# Patient Record
Sex: MALE | Race: WHITE
[De-identification: names, ages, dates, MRNs, and addresses within clinical notes are randomized per-mention and may not be internally consistent; named-entity substitution may affect disease eponyms.]

---

## 2017-06-23 ENCOUNTER — HOSPITAL ENCOUNTER (INPATIENT)
Dept: HOSPITAL 83 - ED | Age: 82
LOS: 1 days | Discharge: HOME | DRG: 637 | End: 2017-06-24
Attending: INTERNAL MEDICINE | Admitting: INTERNAL MEDICINE
Payer: MEDICARE

## 2017-06-23 VITALS — DIASTOLIC BLOOD PRESSURE: 77 MMHG

## 2017-06-23 VITALS — DIASTOLIC BLOOD PRESSURE: 65 MMHG

## 2017-06-23 VITALS — SYSTOLIC BLOOD PRESSURE: 138 MMHG | DIASTOLIC BLOOD PRESSURE: 91 MMHG

## 2017-06-23 VITALS — DIASTOLIC BLOOD PRESSURE: 68 MMHG

## 2017-06-23 VITALS — BODY MASS INDEX: 21.67 KG/M2 | WEIGHT: 160 LBS | HEIGHT: 71.97 IN

## 2017-06-23 VITALS — DIASTOLIC BLOOD PRESSURE: 80 MMHG

## 2017-06-23 VITALS — SYSTOLIC BLOOD PRESSURE: 139 MMHG | DIASTOLIC BLOOD PRESSURE: 77 MMHG

## 2017-06-23 VITALS — DIASTOLIC BLOOD PRESSURE: 69 MMHG | SYSTOLIC BLOOD PRESSURE: 130 MMHG

## 2017-06-23 DIAGNOSIS — E78.5: ICD-10-CM

## 2017-06-23 DIAGNOSIS — Z95.5: ICD-10-CM

## 2017-06-23 DIAGNOSIS — Z91.041: ICD-10-CM

## 2017-06-23 DIAGNOSIS — Z82.49: ICD-10-CM

## 2017-06-23 DIAGNOSIS — Z88.8: ICD-10-CM

## 2017-06-23 DIAGNOSIS — E03.9: ICD-10-CM

## 2017-06-23 DIAGNOSIS — E87.8: ICD-10-CM

## 2017-06-23 DIAGNOSIS — N18.3: ICD-10-CM

## 2017-06-23 DIAGNOSIS — Z79.899: ICD-10-CM

## 2017-06-23 DIAGNOSIS — F33.3: ICD-10-CM

## 2017-06-23 DIAGNOSIS — I50.32: ICD-10-CM

## 2017-06-23 DIAGNOSIS — E87.5: ICD-10-CM

## 2017-06-23 DIAGNOSIS — E13.10: Primary | ICD-10-CM

## 2017-06-23 DIAGNOSIS — Z82.3: ICD-10-CM

## 2017-06-23 DIAGNOSIS — I25.2: ICD-10-CM

## 2017-06-23 DIAGNOSIS — E13.22: ICD-10-CM

## 2017-06-23 DIAGNOSIS — G47.00: ICD-10-CM

## 2017-06-23 DIAGNOSIS — Z83.3: ICD-10-CM

## 2017-06-23 DIAGNOSIS — N17.0: ICD-10-CM

## 2017-06-23 DIAGNOSIS — Z86.73: ICD-10-CM

## 2017-06-23 DIAGNOSIS — I13.0: ICD-10-CM

## 2017-06-23 DIAGNOSIS — E55.9: ICD-10-CM

## 2017-06-23 DIAGNOSIS — D64.9: ICD-10-CM

## 2017-06-23 DIAGNOSIS — Z79.82: ICD-10-CM

## 2017-06-23 DIAGNOSIS — Z79.4: ICD-10-CM

## 2017-06-23 LAB
BASOPHILS # BLD AUTO: 0.1 10*3/UL (ref 0–0.1)
BASOPHILS NFR BLD AUTO: 0.9 % (ref 0–1)
BUN SERPL-MCNC: 21 MG/DL (ref 7–24)
BUN SERPL-MCNC: 22 MG/DL (ref 7–24)
BUN SERPL-MCNC: 26 MG/DL (ref 7–24)
BUN SERPL-MCNC: 27 MG/DL (ref 7–24)
CHLORIDE SERPL-SCNC: 102 MMOL/L (ref 98–107)
CHLORIDE SERPL-SCNC: 105 MMOL/L (ref 98–107)
CHLORIDE SERPL-SCNC: 108 MMOL/L (ref 98–107)
CHLORIDE SERPL-SCNC: 96 MMOL/L (ref 98–107)
CO2 SERPL-SCNC: 19 MMOL/L (ref 21–32)
CO2 SERPL-SCNC: 20 MMOL/L (ref 21–32)
CO2 SERPL-SCNC: 20 MMOL/L (ref 21–32)
CO2 SERPL-SCNC: 21 MMOL/L (ref 21–32)
EOSINOPHIL # BLD AUTO: 0.1 10*3/UL (ref 0–0.4)
EOSINOPHIL # BLD AUTO: 1.4 % (ref 1–4)
ERYTHROCYTE [DISTWIDTH] IN BLOOD BY AUTOMATED COUNT: 12.7 % (ref 0–14.5)
GLUCOSE SERPL-MCNC: 180 MG/DL (ref 65–99)
GLUCOSE SERPL-MCNC: 224 MG/DL (ref 65–99)
GLUCOSE SERPL-MCNC: 424 MG/DL (ref 65–99)
GLUCOSE SERPL-MCNC: 713 MG/DL (ref 65–99)
HCT VFR BLD AUTO: 34.8 % (ref 42–52)
HGB BLD-MCNC: 12.5 G/DL (ref 14–18)
IG #: 0 10*3/UL (ref 0–0.1)
LYMPHOCYTES # BLD AUTO: 1.1 10*3/UL (ref 1.3–4.4)
LYMPHOCYTES NFR BLD AUTO: 16.4 % (ref 27–41)
MCH RBC QN AUTO: 31.2 PG (ref 27–31)
MCHC RBC AUTO-ENTMCNC: 35.9 G/DL (ref 33–37)
MCV RBC AUTO: 86.8 FL (ref 80–94)
MONOCYTES # BLD AUTO: 0.4 10*3/UL (ref 0.1–1)
MONOCYTES NFR BLD MANUAL: 6.5 % (ref 3–9)
NEUT #: 4.9 10*3/UL (ref 2.3–7.9)
NEUT %: 74.5 % (ref 47–73)
NRBC BLD QL AUTO: 0 % (ref 0–0)
PLATELET # BLD AUTO: 183 10*3/UL (ref 130–400)
PMV BLD AUTO: 12.1 FL (ref 9.6–12.3)
POTASSIUM SERPL-SCNC: 4.2 MMOL/L (ref 3.5–5.1)
POTASSIUM SERPL-SCNC: 4.3 MMOL/L (ref 3.5–5.1)
POTASSIUM SERPL-SCNC: 4.4 MMOL/L (ref 3.5–5.1)
POTASSIUM SERPL-SCNC: 5.2 MMOL/L (ref 3.5–5.1)
RBC # BLD AUTO: 4.01 10*6/UL (ref 4.5–5.9)
SODIUM SERPL-SCNC: 130 MMOL/L (ref 136–145)
SODIUM SERPL-SCNC: 137 MMOL/L (ref 136–145)
SODIUM SERPL-SCNC: 138 MMOL/L (ref 136–145)
SODIUM SERPL-SCNC: 139 MMOL/L (ref 136–145)
WBC NRBC COR # BLD AUTO: 6.6 10*3/UL (ref 4.8–10.8)

## 2017-06-24 VITALS — DIASTOLIC BLOOD PRESSURE: 67 MMHG | SYSTOLIC BLOOD PRESSURE: 109 MMHG

## 2017-06-24 VITALS — SYSTOLIC BLOOD PRESSURE: 105 MMHG | DIASTOLIC BLOOD PRESSURE: 75 MMHG

## 2017-06-24 VITALS — SYSTOLIC BLOOD PRESSURE: 103 MMHG | DIASTOLIC BLOOD PRESSURE: 65 MMHG

## 2017-06-24 VITALS — DIASTOLIC BLOOD PRESSURE: 70 MMHG | SYSTOLIC BLOOD PRESSURE: 110 MMHG

## 2017-06-24 LAB
25(OH)D3 SERPL-MCNC: 15 NG/ML (ref 30–100)
BASOPHILS # BLD AUTO: 0.1 10*3/UL (ref 0–0.1)
BASOPHILS NFR BLD AUTO: 0.7 % (ref 0–1)
BUN SERPL-MCNC: 19 MG/DL (ref 7–24)
BUN SERPL-MCNC: 20 MG/DL (ref 7–24)
CHLORIDE SERPL-SCNC: 104 MMOL/L (ref 98–107)
CHLORIDE SERPL-SCNC: 106 MMOL/L (ref 98–107)
CHOLEST SERPL-MCNC: 133 MG/DL (ref ?–200)
CO2 SERPL-SCNC: 22 MMOL/L (ref 21–32)
CO2 SERPL-SCNC: 23 MMOL/L (ref 21–32)
EOSINOPHIL # BLD AUTO: 0.5 10*3/UL (ref 0–0.4)
EOSINOPHIL # BLD AUTO: 6.1 % (ref 1–4)
ERYTHROCYTE [DISTWIDTH] IN BLOOD BY AUTOMATED COUNT: 12.9 % (ref 0–14.5)
EST. AVERAGE GLUCOSE BLD GHB EST-MCNC: 326 MG/DL
FOLATE SERPL-MCNC: 13.96 NG/ML (ref 5.38–?)
GLUCOSE SERPL-MCNC: 127 MG/DL (ref 65–99)
GLUCOSE SERPL-MCNC: 173 MG/DL (ref 65–99)
HCT VFR BLD AUTO: 35.8 % (ref 42–52)
HDLC SERPL-MCNC: 42 MG/DL (ref 40–60)
HGB BLD-MCNC: 12.6 G/DL (ref 14–18)
IG #: 0 10*3/UL (ref 0–0.1)
LDLC SERPL DIRECT ASSAY-MCNC: 63 MG/DL (ref 9–159)
LYMPHOCYTES # BLD AUTO: 1.8 10*3/UL (ref 1.3–4.4)
LYMPHOCYTES NFR BLD AUTO: 23.4 % (ref 27–41)
MAGNESIUM SERPL-MCNC: 1.8 MG/DL (ref 1.5–2.1)
MCH RBC QN AUTO: 31.3 PG (ref 27–31)
MCHC RBC AUTO-ENTMCNC: 35.2 G/DL (ref 33–37)
MCV RBC AUTO: 88.8 FL (ref 80–94)
MONOCYTES # BLD AUTO: 0.4 10*3/UL (ref 0.1–1)
MONOCYTES NFR BLD MANUAL: 5.7 % (ref 3–9)
NEUT #: 4.8 10*3/UL (ref 2.3–7.9)
NEUT %: 63.8 % (ref 47–73)
NRBC BLD QL AUTO: 0 % (ref 0–0)
PHOSPHATE SERPL-MCNC: 2.1 MG/DL (ref 2.5–4.9)
PLATELET # BLD AUTO: 200 10*3/UL (ref 130–400)
PMV BLD AUTO: 12.3 FL (ref 9.6–12.3)
POTASSIUM SERPL-SCNC: 3.7 MMOL/L (ref 3.5–5.1)
POTASSIUM SERPL-SCNC: 3.7 MMOL/L (ref 3.5–5.1)
RBC # BLD AUTO: 4.03 10*6/UL (ref 4.5–5.9)
SODIUM SERPL-SCNC: 137 MMOL/L (ref 136–145)
SODIUM SERPL-SCNC: 141 MMOL/L (ref 136–145)
T4 FREE SERPL-MCNC: 1.06 NG/DL (ref 0.76–1.46)
TRIGL SERPL-MCNC: 140 MG/DL (ref ?–150)
TSH SERPL DL<=0.005 MIU/L-ACNC: 4.77 UIU/ML (ref 0.36–4.75)
VITAMIN B12: 627 PG/ML (ref 247–911)
VLDLC SERPL CALC-MCNC: 28 MG/DL (ref 6–40)
WBC NRBC COR # BLD AUTO: 7.5 10*3/UL (ref 4.8–10.8)

## 2017-07-06 ENCOUNTER — HOSPITAL ENCOUNTER (OUTPATIENT)
Dept: HOSPITAL 83 - LAB | Age: 82
Discharge: HOME | End: 2017-07-06
Attending: INTERNAL MEDICINE
Payer: MEDICARE

## 2017-07-06 DIAGNOSIS — I50.33: Primary | ICD-10-CM

## 2017-07-06 LAB
ABSOLUTE BASO #: 0.1 10*3/UL (ref 0–0.1)
ABSOLUTE EOS #: 0.3 10*3/UL (ref 0–0.4)
ABSOLUTE NEUT #: 4.4 10*3/UL (ref 2.3–7.9)
ALBUMIN SERPL-MCNC: 3.5 GM/DL (ref 3.1–4.5)
ALBUMIN: 3.5 GM/DL (ref 3.1–4.5)
ALP BLD-CCNC: 55 U/L (ref 45–117)
ALP SERPL-CCNC: 55 U/L (ref 45–117)
ALT SERPL W P-5'-P-CCNC: 20 U/L (ref 12–78)
ALT SERPL-CCNC: 20 U/L (ref 12–78)
AST SERPL-CCNC: 17 IU/L (ref 3–35)
AST SERPL-CCNC: 17 IU/L (ref 3–35)
BASOPHILS # BLD AUTO: 0.1 10*3/UL (ref 0–0.1)
BASOPHILS %: 1.1 % (ref 0–1)
BASOPHILS NFR BLD AUTO: 1.1 % (ref 0–1)
BILIRUB SERPL-MCNC: 0.7 MG/DL (ref 0.2–1)
BUN BLDV-MCNC: 22 MG/DL (ref 7–24)
BUN SERPL-MCNC: 22 MG/DL (ref 7–24)
CALCIUM SERPL-MCNC: 8.4 MG/DL (ref 8.5–10.5)
CHLORIDE BLD-SCNC: 105 MMOL/L (ref 98–107)
CHLORIDE SERPL-SCNC: 105 MMOL/L (ref 98–107)
CO2 SERPL-SCNC: 21 MMOL/L (ref 21–32)
CO2: 21 MMOL/L (ref 21–32)
CREAT SERPL-MCNC: 1.55 MG/DL (ref 0.7–1.3)
EOSINOPHIL # BLD AUTO: 0.3 10*3/UL (ref 0–0.4)
EOSINOPHIL # BLD AUTO: 4.8 % (ref 1–4)
EOSINOPHILS %: 4.8 % (ref 1–4)
ERYTHROCYTE [DISTWIDTH] IN BLOOD BY AUTOMATED COUNT: 13.4 % (ref 0–14.5)
GFR AFRICAN AMERICAN: 52 ML/MIN
GFR SERPL CREATININE-BSD FRML MDRD: 43 ML/MIN/
GLUCOSE SERPL-MCNC: 393 MG/DL (ref 65–99)
GLUCOSE: 393 MG/DL (ref 65–99)
HCT VFR BLD AUTO: 36.6 % (ref 42–52)
HCT VFR BLD CALC: 36.6 % (ref 42–52)
HEMOGLOBIN: 12.6 G/DL (ref 14–18)
HGB BLD-MCNC: 12.6 G/DL (ref 14–18)
IG #: 0 10*3/UL (ref 0–0.1)
IMMATURE GRANULOCYTES #: 0 10*3/UL (ref 0–0.1)
IMMATURE GRANULOCYTES: 0.2 % (ref 0–1)
LYMPHOCYTE %: 19.2 % (ref 27–41)
LYMPHOCYTES # BLD AUTO: 1.3 10*3/UL (ref 1.3–4.4)
LYMPHOCYTES # BLD: 1.3 10*3/UL (ref 1.3–4.4)
LYMPHOCYTES NFR BLD AUTO: 19.2 % (ref 27–41)
MCH RBC QN AUTO: 31.8 PG (ref 27–31)
MCH RBC QN AUTO: 31.8 PG (ref 27–31)
MCHC RBC AUTO-ENTMCNC: 34.4 G/DL (ref 33–37)
MCHC RBC AUTO-ENTMCNC: 34.4 G/DL (ref 33–37)
MCV RBC AUTO: 92.4 FL (ref 80–94)
MCV RBC AUTO: 92.4 FL (ref 80–94)
MONOCYTES # BLD AUTO: 0.5 10*3/UL (ref 0.1–1)
MONOCYTES # BLD: 0.5 10*3/UL (ref 0.1–1)
MONOCYTES %: 7.7 % (ref 3–9)
MONOCYTES NFR BLD MANUAL: 7.7 % (ref 3–9)
NEUT #: 4.4 10*3/UL (ref 2.3–7.9)
NEUT %: 67 % (ref 47–73)
NEUTROPHILS %: 67 % (ref 47–73)
NRBC BLD QL AUTO: 0 % (ref 0–0)
NUCLEATED RED BLOOD CELLS: 0 % (ref 0–0)
PDW BLD-RTO: 13.4 % (ref 0–14.5)
PLATELET # BLD AUTO: 199 10*3/UL (ref 130–400)
PLATELET # BLD: 199 10*3/UL (ref 130–400)
PMV BLD AUTO: 12.2 FL (ref 9.6–12.3)
PMV BLD AUTO: 12.2 FL (ref 9.6–12.3)
POTASSIUM SERPL-SCNC: 5.1 MMOL/L (ref 3.5–5.1)
POTASSIUM SERPL-SCNC: 5.1 MMOL/L (ref 3.5–5.1)
PROT SERPL-MCNC: 6.3 GM/DL (ref 6.4–8.2)
RBC # BLD AUTO: 3.96 10*6/UL (ref 4.5–5.9)
RBC # BLD: 3.96 10*6/UL (ref 4.5–5.9)
SODIUM BLD-SCNC: 137 MMOL/L (ref 136–145)
SODIUM SERPL-SCNC: 137 MMOL/L (ref 136–145)
TOTAL PROTEIN: 6.3 GM/DL (ref 6.4–8.2)
WBC # BLD: 6.6 10*3/UL (ref 4.8–10.8)
WBC NRBC COR # BLD AUTO: 6.6 10*3/UL (ref 4.8–10.8)

## 2017-07-14 ENCOUNTER — HOSPITAL ENCOUNTER (OUTPATIENT)
Dept: HOSPITAL 83 - CARD | Age: 82
Discharge: HOME | End: 2017-07-14
Attending: INTERNAL MEDICINE
Payer: MEDICARE

## 2017-07-14 DIAGNOSIS — I50.33: Primary | ICD-10-CM

## 2017-07-19 ENCOUNTER — HOSPITAL ENCOUNTER (INPATIENT)
Dept: HOSPITAL 83 - ED | Age: 82
LOS: 1 days | Discharge: HOME | DRG: 313 | End: 2017-07-20
Attending: INTERNAL MEDICINE | Admitting: INTERNAL MEDICINE
Payer: MEDICARE

## 2017-07-19 VITALS — SYSTOLIC BLOOD PRESSURE: 112 MMHG | DIASTOLIC BLOOD PRESSURE: 72 MMHG

## 2017-07-19 VITALS — WEIGHT: 162.5 LBS | DIASTOLIC BLOOD PRESSURE: 69 MMHG | BODY MASS INDEX: 22.01 KG/M2 | HEIGHT: 72 IN

## 2017-07-19 VITALS — DIASTOLIC BLOOD PRESSURE: 71 MMHG | SYSTOLIC BLOOD PRESSURE: 119 MMHG

## 2017-07-19 VITALS — DIASTOLIC BLOOD PRESSURE: 58 MMHG | SYSTOLIC BLOOD PRESSURE: 98 MMHG

## 2017-07-19 VITALS — DIASTOLIC BLOOD PRESSURE: 66 MMHG

## 2017-07-19 VITALS — DIASTOLIC BLOOD PRESSURE: 81 MMHG

## 2017-07-19 DIAGNOSIS — Z95.5: ICD-10-CM

## 2017-07-19 DIAGNOSIS — D64.9: ICD-10-CM

## 2017-07-19 DIAGNOSIS — I13.0: ICD-10-CM

## 2017-07-19 DIAGNOSIS — Z88.8: ICD-10-CM

## 2017-07-19 DIAGNOSIS — Z95.1: ICD-10-CM

## 2017-07-19 DIAGNOSIS — Z91.041: ICD-10-CM

## 2017-07-19 DIAGNOSIS — I25.10: ICD-10-CM

## 2017-07-19 DIAGNOSIS — N18.3: ICD-10-CM

## 2017-07-19 DIAGNOSIS — R07.89: Primary | ICD-10-CM

## 2017-07-19 DIAGNOSIS — F32.9: ICD-10-CM

## 2017-07-19 DIAGNOSIS — I50.32: ICD-10-CM

## 2017-07-19 DIAGNOSIS — E03.9: ICD-10-CM

## 2017-07-19 DIAGNOSIS — N17.0: ICD-10-CM

## 2017-07-19 DIAGNOSIS — E78.5: ICD-10-CM

## 2017-07-19 DIAGNOSIS — Z88.5: ICD-10-CM

## 2017-07-19 DIAGNOSIS — I25.2: ICD-10-CM

## 2017-07-19 DIAGNOSIS — E55.9: ICD-10-CM

## 2017-07-19 DIAGNOSIS — E11.22: ICD-10-CM

## 2017-07-19 DIAGNOSIS — Z86.73: ICD-10-CM

## 2017-07-19 DIAGNOSIS — G47.00: ICD-10-CM

## 2017-07-19 LAB
ALBUMIN SERPL-MCNC: 3.4 GM/DL (ref 3.1–4.5)
ALP SERPL-CCNC: 63 U/L (ref 45–117)
ALT SERPL W P-5'-P-CCNC: 16 U/L (ref 12–78)
AST SERPL-CCNC: 11 IU/L (ref 3–35)
BASOPHILS # BLD AUTO: 0.1 10*3/UL (ref 0–0.1)
BASOPHILS NFR BLD AUTO: 1 % (ref 0–1)
BUN SERPL-MCNC: 15 MG/DL (ref 7–24)
CHLORIDE SERPL-SCNC: 107 MMOL/L (ref 98–107)
CO2 SERPL-SCNC: 21 MMOL/L (ref 21–32)
EOSINOPHIL # BLD AUTO: 0.4 10*3/UL (ref 0–0.4)
EOSINOPHIL # BLD AUTO: 4.8 % (ref 1–4)
ERYTHROCYTE [DISTWIDTH] IN BLOOD BY AUTOMATED COUNT: 13.2 % (ref 0–14.5)
GLUCOSE SERPL-MCNC: 259 MG/DL (ref 65–99)
HCT VFR BLD AUTO: 36.6 % (ref 42–52)
HGB BLD-MCNC: 12.7 G/DL (ref 14–18)
IG #: 0 10*3/UL (ref 0–0.1)
INR BLD: 1 (ref 2–3.5)
LYMPHOCYTES # BLD AUTO: 1.5 10*3/UL (ref 1.3–4.4)
LYMPHOCYTES NFR BLD AUTO: 18 % (ref 27–41)
MAGNESIUM SERPL-MCNC: 1.9 MG/DL (ref 1.5–2.1)
MCH RBC QN AUTO: 31.4 PG (ref 27–31)
MCHC RBC AUTO-ENTMCNC: 34.7 G/DL (ref 33–37)
MCV RBC AUTO: 90.6 FL (ref 80–94)
MONOCYTES # BLD AUTO: 0.7 10*3/UL (ref 0.1–1)
MONOCYTES NFR BLD MANUAL: 8.1 % (ref 3–9)
NEUT #: 5.5 10*3/UL (ref 2.3–7.9)
NEUT %: 67.7 % (ref 47–73)
NRBC BLD QL AUTO: 0 % (ref 0–0)
PLATELET # BLD AUTO: 211 10*3/UL (ref 130–400)
PMV BLD AUTO: 11.3 FL (ref 9.6–12.3)
POTASSIUM SERPL-SCNC: 4.7 MMOL/L (ref 3.5–5.1)
PROT SERPL-MCNC: 6.5 GM/DL (ref 6.4–8.2)
PROTHROMBIN TIME: 10.3 SECONDS (ref 9–12.4)
RBC # BLD AUTO: 4.04 10*6/UL (ref 4.5–5.9)
SODIUM SERPL-SCNC: 134 MMOL/L (ref 136–145)
TROPONIN I SERPL-MCNC: < 0.015 NG/ML (ref ?–0.04)
WBC NRBC COR # BLD AUTO: 8.1 10*3/UL (ref 4.8–10.8)

## 2017-07-20 VITALS — DIASTOLIC BLOOD PRESSURE: 75 MMHG

## 2017-07-20 VITALS — DIASTOLIC BLOOD PRESSURE: 66 MMHG

## 2017-07-20 LAB
25(OH)D3 SERPL-MCNC: 34.2 NG/ML (ref 30–100)
ALBUMIN SERPL-MCNC: 3.3 GM/DL (ref 3.1–4.5)
ALP SERPL-CCNC: 61 U/L (ref 45–117)
ALT SERPL W P-5'-P-CCNC: 15 U/L (ref 12–78)
AST SERPL-CCNC: 14 IU/L (ref 3–35)
BASOPHILS # BLD AUTO: 0.1 10*3/UL (ref 0–0.1)
BASOPHILS NFR BLD AUTO: 1.2 % (ref 0–1)
BUN SERPL-MCNC: 17 MG/DL (ref 7–24)
CHLORIDE SERPL-SCNC: 109 MMOL/L (ref 98–107)
CHOLEST SERPL-MCNC: 97 MG/DL (ref ?–200)
CO2 SERPL-SCNC: 23 MMOL/L (ref 21–32)
EOSINOPHIL # BLD AUTO: 0.4 10*3/UL (ref 0–0.4)
EOSINOPHIL # BLD AUTO: 5.3 % (ref 1–4)
ERYTHROCYTE [DISTWIDTH] IN BLOOD BY AUTOMATED COUNT: 13.4 % (ref 0–14.5)
EST. AVERAGE GLUCOSE BLD GHB EST-MCNC: 303 MG/DL
FOLATE SERPL-MCNC: 16.54 NG/ML (ref 5.38–?)
GLUCOSE SERPL-MCNC: 98 MG/DL (ref 65–99)
HCT VFR BLD AUTO: 35.9 % (ref 42–52)
HDLC SERPL-MCNC: 38 MG/DL (ref 40–60)
HGB BLD-MCNC: 12.5 G/DL (ref 14–18)
IG #: 0 10*3/UL (ref 0–0.1)
LDLC SERPL DIRECT ASSAY-MCNC: 41 MG/DL (ref 9–159)
LYMPHOCYTES # BLD AUTO: 1.5 10*3/UL (ref 1.3–4.4)
LYMPHOCYTES NFR BLD AUTO: 21.8 % (ref 27–41)
MAGNESIUM SERPL-MCNC: 2 MG/DL (ref 1.5–2.1)
MCH RBC QN AUTO: 31.9 PG (ref 27–31)
MCHC RBC AUTO-ENTMCNC: 34.8 G/DL (ref 33–37)
MCV RBC AUTO: 91.6 FL (ref 80–94)
MONOCYTES # BLD AUTO: 0.6 10*3/UL (ref 0.1–1)
MONOCYTES NFR BLD MANUAL: 8.1 % (ref 3–9)
NEUT #: 4.4 10*3/UL (ref 2.3–7.9)
NEUT %: 63.5 % (ref 47–73)
NRBC BLD QL AUTO: 0 10*3/UL (ref 0–0)
PHOSPHATE SERPL-MCNC: 3 MG/DL (ref 2.5–4.9)
PLATELET # BLD AUTO: 217 10*3/UL (ref 130–400)
PMV BLD AUTO: 11.9 FL (ref 9.6–12.3)
POTASSIUM SERPL-SCNC: 3.9 MMOL/L (ref 3.5–5.1)
PROT SERPL-MCNC: 6.2 GM/DL (ref 6.4–8.2)
RBC # BLD AUTO: 3.92 10*6/UL (ref 4.5–5.9)
SODIUM SERPL-SCNC: 141 MMOL/L (ref 136–145)
TRIGL SERPL-MCNC: 88 MG/DL (ref ?–150)
VITAMIN B12: 472 PG/ML (ref 247–911)
VLDLC SERPL CALC-MCNC: 18 MG/DL (ref 6–40)
WBC NRBC COR # BLD AUTO: 6.9 10*3/UL (ref 4.8–10.8)

## 2017-08-22 ENCOUNTER — HOSPITAL ENCOUNTER (OUTPATIENT)
Dept: HOSPITAL 83 - CT | Age: 82
Discharge: HOME | End: 2017-08-22
Payer: MEDICARE

## 2017-08-22 DIAGNOSIS — I25.10: Primary | ICD-10-CM

## 2017-08-22 DIAGNOSIS — J43.8: ICD-10-CM

## 2017-08-22 DIAGNOSIS — I63.9: ICD-10-CM

## 2017-08-22 DIAGNOSIS — M47.894: ICD-10-CM

## 2017-08-22 DIAGNOSIS — Z95.1: ICD-10-CM

## 2017-08-22 DIAGNOSIS — E03.4: ICD-10-CM

## 2017-08-22 DIAGNOSIS — R09.89: ICD-10-CM

## 2017-08-22 DIAGNOSIS — I10: ICD-10-CM

## 2017-08-22 DIAGNOSIS — E78.5: ICD-10-CM

## 2017-08-22 DIAGNOSIS — I71.2: ICD-10-CM

## 2017-10-25 ENCOUNTER — HOSPITAL ENCOUNTER (OUTPATIENT)
Dept: HOSPITAL 83 - US | Age: 82
Discharge: HOME | End: 2017-10-25
Payer: MEDICARE

## 2017-10-25 DIAGNOSIS — I65.23: Primary | ICD-10-CM

## 2017-10-26 ENCOUNTER — HOSPITAL ENCOUNTER (INPATIENT)
Dept: HOSPITAL 83 - ED | Age: 82
LOS: 1 days | Discharge: HOME | DRG: 637 | End: 2017-10-27
Attending: INTERNAL MEDICINE | Admitting: INTERNAL MEDICINE
Payer: MEDICARE

## 2017-10-26 VITALS — SYSTOLIC BLOOD PRESSURE: 140 MMHG | DIASTOLIC BLOOD PRESSURE: 82 MMHG

## 2017-10-26 VITALS — DIASTOLIC BLOOD PRESSURE: 88 MMHG

## 2017-10-26 VITALS
SYSTOLIC BLOOD PRESSURE: 140 MMHG | WEIGHT: 137.44 LBS | BODY MASS INDEX: 18.62 KG/M2 | HEIGHT: 71.97 IN | DIASTOLIC BLOOD PRESSURE: 85 MMHG

## 2017-10-26 VITALS — SYSTOLIC BLOOD PRESSURE: 140 MMHG | DIASTOLIC BLOOD PRESSURE: 90 MMHG

## 2017-10-26 VITALS — DIASTOLIC BLOOD PRESSURE: 77 MMHG | SYSTOLIC BLOOD PRESSURE: 110 MMHG

## 2017-10-26 VITALS — DIASTOLIC BLOOD PRESSURE: 74 MMHG | SYSTOLIC BLOOD PRESSURE: 122 MMHG

## 2017-10-26 DIAGNOSIS — D72.810: ICD-10-CM

## 2017-10-26 DIAGNOSIS — I13.0: ICD-10-CM

## 2017-10-26 DIAGNOSIS — I25.10: ICD-10-CM

## 2017-10-26 DIAGNOSIS — I71.2: ICD-10-CM

## 2017-10-26 DIAGNOSIS — E11.22: ICD-10-CM

## 2017-10-26 DIAGNOSIS — N17.9: ICD-10-CM

## 2017-10-26 DIAGNOSIS — Z82.49: ICD-10-CM

## 2017-10-26 DIAGNOSIS — I25.2: ICD-10-CM

## 2017-10-26 DIAGNOSIS — Z95.0: ICD-10-CM

## 2017-10-26 DIAGNOSIS — E11.65: Primary | ICD-10-CM

## 2017-10-26 DIAGNOSIS — Z79.899: ICD-10-CM

## 2017-10-26 DIAGNOSIS — Z80.0: ICD-10-CM

## 2017-10-26 DIAGNOSIS — Z95.5: ICD-10-CM

## 2017-10-26 DIAGNOSIS — I50.32: ICD-10-CM

## 2017-10-26 DIAGNOSIS — Z53.29: ICD-10-CM

## 2017-10-26 DIAGNOSIS — Z83.3: ICD-10-CM

## 2017-10-26 DIAGNOSIS — G47.00: ICD-10-CM

## 2017-10-26 DIAGNOSIS — Z79.82: ICD-10-CM

## 2017-10-26 DIAGNOSIS — E87.8: ICD-10-CM

## 2017-10-26 DIAGNOSIS — Z82.3: ICD-10-CM

## 2017-10-26 DIAGNOSIS — E87.5: ICD-10-CM

## 2017-10-26 DIAGNOSIS — N18.3: ICD-10-CM

## 2017-10-26 DIAGNOSIS — Z91.14: ICD-10-CM

## 2017-10-26 DIAGNOSIS — E78.5: ICD-10-CM

## 2017-10-26 DIAGNOSIS — E03.9: ICD-10-CM

## 2017-10-26 DIAGNOSIS — E11.00: ICD-10-CM

## 2017-10-26 DIAGNOSIS — Z91.041: ICD-10-CM

## 2017-10-26 DIAGNOSIS — E87.1: ICD-10-CM

## 2017-10-26 DIAGNOSIS — Z86.73: ICD-10-CM

## 2017-10-26 DIAGNOSIS — Z88.8: ICD-10-CM

## 2017-10-26 DIAGNOSIS — Z79.4: ICD-10-CM

## 2017-10-26 DIAGNOSIS — Z88.6: ICD-10-CM

## 2017-10-26 LAB
ALBUMIN SERPL-MCNC: 3.8 GM/DL (ref 3.1–4.5)
ALP SERPL-CCNC: 76 U/L (ref 45–117)
ALT SERPL W P-5'-P-CCNC: 18 U/L (ref 12–78)
APPEARANCE UR: CLEAR
APTT PPP: 23.4 SECONDS (ref 20.8–31.5)
AST SERPL-CCNC: 11 IU/L (ref 3–35)
BACTERIA #/AREA URNS HPF: (no result) /[HPF]
BASOPHILS # BLD AUTO: 0.1 10*3/UL (ref 0–0.1)
BASOPHILS NFR BLD AUTO: 1 % (ref 0–1)
BILIRUB UR QL STRIP: NEGATIVE
BUN SERPL-MCNC: 28 MG/DL (ref 7–24)
CHLORIDE SERPL-SCNC: 94 MMOL/L (ref 98–107)
COLOR UR: YELLOW
CREAT SERPL-MCNC: 1.6 MG/DL (ref 0.7–1.3)
EOSINOPHIL # BLD AUTO: 0.1 10*3/UL (ref 0–0.4)
EOSINOPHIL # BLD AUTO: 1.8 % (ref 1–4)
EPI CELLS #/AREA URNS HPF: (no result) /[HPF]
ERYTHROCYTE [DISTWIDTH] IN BLOOD BY AUTOMATED COUNT: 12.1 % (ref 0–14.5)
GLUCOSE UR QL: (no result)
HCT VFR BLD AUTO: 40 % (ref 42–52)
HGB BLD-MCNC: 14 G/DL (ref 14–18)
HGB UR QL STRIP: (no result)
INR BLD: 1 (ref 2–3.5)
KETONES UR QL STRIP: (no result)
LEUKOCYTE ESTERASE UR QL STRIP: NEGATIVE
LYMPHOCYTES # BLD AUTO: 1.1 10*3/UL (ref 1.3–4.4)
LYMPHOCYTES NFR BLD AUTO: 14.2 % (ref 27–41)
MCH RBC QN AUTO: 30.3 PG (ref 27–31)
MCHC RBC AUTO-ENTMCNC: 35 G/DL (ref 33–37)
MCV RBC AUTO: 86.6 FL (ref 80–94)
MONOCYTES # BLD AUTO: 0.5 10*3/UL (ref 0.1–1)
MONOCYTES NFR BLD MANUAL: 6.1 % (ref 3–9)
NEUT #: 5.9 10*3/UL (ref 2.3–7.9)
NEUT %: 76.5 % (ref 47–73)
NITRITE UR QL STRIP: NEGATIVE
NRBC BLD QL AUTO: 0 10*3/UL (ref 0–0)
PH UR STRIP: 6.5 [PH] (ref 5–9)
PLATELET # BLD AUTO: 212 10*3/UL (ref 130–400)
PMV BLD AUTO: 11.5 FL (ref 9.6–12.3)
POTASSIUM SERPL-SCNC: 5.2 MMOL/L (ref 3.5–5.1)
PROT SERPL-MCNC: 6.5 GM/DL (ref 6.4–8.2)
RBC # BLD AUTO: 4.62 10*6/UL (ref 4.5–5.9)
RBC #/AREA URNS HPF: (no result) RBC/HPF (ref 0–2)
SODIUM SERPL-SCNC: 128 MMOL/L (ref 136–145)
SP GR UR: 1.01 (ref 1–1.03)
TROPONIN I SERPL-MCNC: 0.02 NG/ML (ref ?–0.04)
UROBILINOGEN UR STRIP-MCNC: 0.2 E.U./DL (ref 0.2–1)
WBC #/AREA URNS HPF: (no result) WBC/HPF (ref 0–5)
WBC NRBC COR # BLD AUTO: 7.8 10*3/UL (ref 4.8–10.8)

## 2017-10-27 VITALS — DIASTOLIC BLOOD PRESSURE: 60 MMHG

## 2017-10-27 VITALS — DIASTOLIC BLOOD PRESSURE: 71 MMHG | SYSTOLIC BLOOD PRESSURE: 100 MMHG

## 2017-10-27 VITALS — DIASTOLIC BLOOD PRESSURE: 68 MMHG

## 2017-10-27 LAB
25(OH)D3 SERPL-MCNC: 27.7 NG/ML (ref 30–100)
ALBUMIN SERPL-MCNC: 3.2 GM/DL (ref 3.1–4.5)
ALP SERPL-CCNC: 63 U/L (ref 45–117)
ALT SERPL W P-5'-P-CCNC: 16 U/L (ref 12–78)
APTT PPP: 22.4 SECONDS (ref 20.8–31.5)
AST SERPL-CCNC: 15 IU/L (ref 3–35)
BASOPHILS # BLD AUTO: 0.1 10*3/UL (ref 0–0.1)
BASOPHILS NFR BLD AUTO: 0.9 % (ref 0–1)
BUN SERPL-MCNC: 23 MG/DL (ref 7–24)
CHLORIDE SERPL-SCNC: 102 MMOL/L (ref 98–107)
CREAT SERPL-MCNC: 1.04 MG/DL (ref 0.7–1.3)
EOSINOPHIL # BLD AUTO: 0.3 10*3/UL (ref 0–0.4)
EOSINOPHIL # BLD AUTO: 3.7 % (ref 1–4)
ERYTHROCYTE [DISTWIDTH] IN BLOOD BY AUTOMATED COUNT: 12.2 % (ref 0–14.5)
HCT VFR BLD AUTO: 37.2 % (ref 42–52)
HGB BLD-MCNC: 13.1 G/DL (ref 14–18)
INR BLD: 1 (ref 2–3.5)
LYMPHOCYTES # BLD AUTO: 2.1 10*3/UL (ref 1.3–4.4)
LYMPHOCYTES NFR BLD AUTO: 24.1 % (ref 27–41)
MCH RBC QN AUTO: 30.9 PG (ref 27–31)
MCHC RBC AUTO-ENTMCNC: 35.2 G/DL (ref 33–37)
MCV RBC AUTO: 87.7 FL (ref 80–94)
MONOCYTES # BLD AUTO: 0.5 10*3/UL (ref 0.1–1)
MONOCYTES NFR BLD MANUAL: 6.1 % (ref 3–9)
NEUT #: 5.6 10*3/UL (ref 2.3–7.9)
NEUT %: 64.8 % (ref 47–73)
NRBC BLD QL AUTO: 0 % (ref 0–0)
PHOSPHATE SERPL-MCNC: 3.5 MG/DL (ref 2.5–4.9)
PLATELET # BLD AUTO: 209 10*3/UL (ref 130–400)
PMV BLD AUTO: 11.9 FL (ref 9.6–12.3)
POTASSIUM SERPL-SCNC: 4 MMOL/L (ref 3.5–5.1)
PROT SERPL-MCNC: 5.6 GM/DL (ref 6.4–8.2)
RBC # BLD AUTO: 4.24 10*6/UL (ref 4.5–5.9)
SODIUM SERPL-SCNC: 134 MMOL/L (ref 136–145)
T4 FREE SERPL-MCNC: 1.14 NG/DL (ref 0.76–1.46)
TSH SERPL DL<=0.005 MIU/L-ACNC: 4.71 UIU/ML (ref 0.36–4.75)
VITAMIN B12: 757 PG/ML (ref 247–911)
WBC NRBC COR # BLD AUTO: 8.6 10*3/UL (ref 4.8–10.8)

## 2018-07-26 ENCOUNTER — HOSPITAL ENCOUNTER (INPATIENT)
Dept: HOSPITAL 83 - ED | Age: 83
LOS: 4 days | Discharge: OTHER INSTITUTION | DRG: 637 | End: 2018-07-30
Attending: INTERNAL MEDICINE | Admitting: INTERNAL MEDICINE
Payer: MEDICARE

## 2018-07-26 VITALS — SYSTOLIC BLOOD PRESSURE: 139 MMHG | DIASTOLIC BLOOD PRESSURE: 77 MMHG

## 2018-07-26 VITALS — DIASTOLIC BLOOD PRESSURE: 91 MMHG

## 2018-07-26 VITALS — BODY MASS INDEX: 19.03 KG/M2 | HEIGHT: 72 IN | WEIGHT: 140.5 LBS

## 2018-07-26 VITALS — SYSTOLIC BLOOD PRESSURE: 110 MMHG | DIASTOLIC BLOOD PRESSURE: 64 MMHG

## 2018-07-26 VITALS — SYSTOLIC BLOOD PRESSURE: 167 MMHG | DIASTOLIC BLOOD PRESSURE: 94 MMHG

## 2018-07-26 DIAGNOSIS — Z91.041: ICD-10-CM

## 2018-07-26 DIAGNOSIS — E78.00: ICD-10-CM

## 2018-07-26 DIAGNOSIS — Z82.49: ICD-10-CM

## 2018-07-26 DIAGNOSIS — N18.3: ICD-10-CM

## 2018-07-26 DIAGNOSIS — Z79.82: ICD-10-CM

## 2018-07-26 DIAGNOSIS — E11.22: ICD-10-CM

## 2018-07-26 DIAGNOSIS — E78.5: ICD-10-CM

## 2018-07-26 DIAGNOSIS — E03.9: ICD-10-CM

## 2018-07-26 DIAGNOSIS — I13.0: ICD-10-CM

## 2018-07-26 DIAGNOSIS — G47.00: ICD-10-CM

## 2018-07-26 DIAGNOSIS — I21.A1: ICD-10-CM

## 2018-07-26 DIAGNOSIS — Z79.4: ICD-10-CM

## 2018-07-26 DIAGNOSIS — E87.5: ICD-10-CM

## 2018-07-26 DIAGNOSIS — I25.2: ICD-10-CM

## 2018-07-26 DIAGNOSIS — Z86.73: ICD-10-CM

## 2018-07-26 DIAGNOSIS — Z95.1: ICD-10-CM

## 2018-07-26 DIAGNOSIS — E86.0: ICD-10-CM

## 2018-07-26 DIAGNOSIS — E11.00: Primary | ICD-10-CM

## 2018-07-26 DIAGNOSIS — Z95.5: ICD-10-CM

## 2018-07-26 DIAGNOSIS — Z80.0: ICD-10-CM

## 2018-07-26 DIAGNOSIS — E55.9: ICD-10-CM

## 2018-07-26 DIAGNOSIS — Z88.8: ICD-10-CM

## 2018-07-26 DIAGNOSIS — I25.10: ICD-10-CM

## 2018-07-26 DIAGNOSIS — E87.1: ICD-10-CM

## 2018-07-26 DIAGNOSIS — Z88.6: ICD-10-CM

## 2018-07-26 DIAGNOSIS — I50.32: ICD-10-CM

## 2018-07-26 DIAGNOSIS — N17.0: ICD-10-CM

## 2018-07-26 DIAGNOSIS — Z82.3: ICD-10-CM

## 2018-07-26 DIAGNOSIS — Z79.84: ICD-10-CM

## 2018-07-26 DIAGNOSIS — Z95.0: ICD-10-CM

## 2018-07-26 DIAGNOSIS — E11.65: ICD-10-CM

## 2018-07-26 LAB
ALBUMIN SERPL-MCNC: 4 GM/DL (ref 3.1–4.5)
ALP SERPL-CCNC: 122 U/L (ref 45–117)
ALT SERPL W P-5'-P-CCNC: 27 U/L (ref 12–78)
APPEARANCE UR: CLEAR
AST SERPL-CCNC: 22 IU/L (ref 3–35)
BACTERIA #/AREA URNS HPF: (no result) /[HPF]
BASOPHILS # BLD AUTO: 0.1 10*3/UL (ref 0–0.1)
BASOPHILS NFR BLD AUTO: 0.8 % (ref 0–1)
BILIRUB UR QL STRIP: NEGATIVE
BUN SERPL-MCNC: 28 MG/DL (ref 7–24)
BUN SERPL-MCNC: 31 MG/DL (ref 7–24)
BUN SERPL-MCNC: 35 MG/DL (ref 7–24)
BUN SERPL-MCNC: 40 MG/DL (ref 7–24)
CHLORIDE SERPL-SCNC: 103 MMOL/L (ref 98–107)
CHLORIDE SERPL-SCNC: 83 MMOL/L (ref 98–107)
CHLORIDE SERPL-SCNC: 92 MMOL/L (ref 98–107)
CHLORIDE SERPL-SCNC: 99 MMOL/L (ref 98–107)
COLOR UR: YELLOW
CREAT SERPL-MCNC: 1.11 MG/DL (ref 0.7–1.3)
CREAT SERPL-MCNC: 1.43 MG/DL (ref 0.7–1.3)
CREAT SERPL-MCNC: 1.45 MG/DL (ref 0.7–1.3)
CREAT SERPL-MCNC: 1.85 MG/DL (ref 0.7–1.3)
EOSINOPHIL # BLD AUTO: 0.1 10*3/UL (ref 0–0.4)
EOSINOPHIL # BLD AUTO: 1 % (ref 1–4)
EPI CELLS #/AREA URNS HPF: (no result) /[HPF]
ERYTHROCYTE [DISTWIDTH] IN BLOOD BY AUTOMATED COUNT: 13.2 % (ref 0–14.5)
GLUCOSE UR QL: (no result)
HCT VFR BLD AUTO: 40.9 % (ref 42–52)
HGB BLD-MCNC: 13.1 G/DL (ref 14–18)
HGB UR QL STRIP: (no result)
KETONES UR QL STRIP: NEGATIVE
LEUKOCYTE ESTERASE UR QL STRIP: NEGATIVE
LYMPHOCYTES # BLD AUTO: 0.6 10*3/UL (ref 1.3–4.4)
LYMPHOCYTES NFR BLD AUTO: 7.2 % (ref 27–41)
MCH RBC QN AUTO: 30 PG (ref 27–31)
MCHC RBC AUTO-ENTMCNC: 32 G/DL (ref 33–37)
MCV RBC AUTO: 93.6 FL (ref 80–94)
MONOCYTES # BLD AUTO: 0.5 10*3/UL (ref 0.1–1)
MONOCYTES NFR BLD MANUAL: 5.5 % (ref 3–9)
NEUT #: 7.4 10*3/UL (ref 2.3–7.9)
NEUT %: 85.4 % (ref 47–73)
NITRITE UR QL STRIP: NEGATIVE
NRBC BLD QL AUTO: 0 10*3/UL (ref 0–0)
PH UR STRIP: 7 [PH] (ref 5–9)
PLATELET # BLD AUTO: 248 10*3/UL (ref 130–400)
PMV BLD AUTO: 12.6 FL (ref 9.6–12.3)
POTASSIUM SERPL-SCNC: 3.8 MMOL/L (ref 3.5–5.1)
POTASSIUM SERPL-SCNC: 3.9 MMOL/L (ref 3.5–5.1)
POTASSIUM SERPL-SCNC: 4.3 MMOL/L (ref 3.5–5.1)
POTASSIUM SERPL-SCNC: 5.2 MMOL/L (ref 3.5–5.1)
PROT SERPL-MCNC: 7.6 GM/DL (ref 6.4–8.2)
RBC # BLD AUTO: 4.37 10*6/UL (ref 4.5–5.9)
RBC #/AREA URNS HPF: (no result) RBC/HPF (ref 0–2)
SODIUM SERPL-SCNC: 119 MMOL/L (ref 136–145)
SODIUM SERPL-SCNC: 127 MMOL/L (ref 136–145)
SODIUM SERPL-SCNC: 133 MMOL/L (ref 136–145)
SODIUM SERPL-SCNC: 137 MMOL/L (ref 136–145)
SP GR UR: <= 1.005 (ref 1–1.03)
TROPONIN I SERPL-MCNC: 0.07 NG/ML (ref ?–0.04)
UROBILINOGEN UR STRIP-MCNC: 0.2 E.U./DL (ref 0.2–1)
WBC #/AREA URNS HPF: (no result) WBC/HPF (ref 0–5)
WBC NRBC COR # BLD AUTO: 8.7 10*3/UL (ref 4.8–10.8)

## 2018-07-27 VITALS — DIASTOLIC BLOOD PRESSURE: 70 MMHG | SYSTOLIC BLOOD PRESSURE: 138 MMHG

## 2018-07-27 VITALS — SYSTOLIC BLOOD PRESSURE: 138 MMHG | DIASTOLIC BLOOD PRESSURE: 72 MMHG

## 2018-07-27 VITALS — DIASTOLIC BLOOD PRESSURE: 67 MMHG

## 2018-07-27 VITALS — DIASTOLIC BLOOD PRESSURE: 88 MMHG

## 2018-07-27 VITALS — SYSTOLIC BLOOD PRESSURE: 109 MMHG | DIASTOLIC BLOOD PRESSURE: 64 MMHG

## 2018-07-27 LAB
25(OH)D3 SERPL-MCNC: 26.9 NG/ML (ref 30–100)
ALBUMIN SERPL-MCNC: 3 GM/DL (ref 3.1–4.5)
ALP SERPL-CCNC: 83 U/L (ref 45–117)
ALT SERPL W P-5'-P-CCNC: 19 U/L (ref 12–78)
APTT PPP: 22.7 SECONDS (ref 20.8–31.5)
AST SERPL-CCNC: 23 IU/L (ref 3–35)
BASOPHILS # BLD AUTO: 0.1 10*3/UL (ref 0–0.1)
BASOPHILS NFR BLD AUTO: 0.9 % (ref 0–1)
BUN SERPL-MCNC: 24 MG/DL (ref 7–24)
CHLORIDE SERPL-SCNC: 104 MMOL/L (ref 98–107)
CHOLEST SERPL-MCNC: 119 MG/DL (ref ?–200)
CREAT SERPL-MCNC: 0.95 MG/DL (ref 0.7–1.3)
EOSINOPHIL # BLD AUTO: 0.4 10*3/UL (ref 0–0.4)
EOSINOPHIL # BLD AUTO: 4.1 % (ref 1–4)
ERYTHROCYTE [DISTWIDTH] IN BLOOD BY AUTOMATED COUNT: 12.9 % (ref 0–14.5)
HCT VFR BLD AUTO: 33.1 % (ref 42–52)
HDLC SERPL-MCNC: 37 MG/DL (ref 40–60)
HGB BLD-MCNC: 11.4 G/DL (ref 14–18)
INR BLD: 1 (ref 2–3.5)
LDLC SERPL DIRECT ASSAY-MCNC: 62 MG/DL (ref 9–159)
LYMPHOCYTES # BLD AUTO: 1.4 10*3/UL (ref 1.3–4.4)
LYMPHOCYTES NFR BLD AUTO: 14.6 % (ref 27–41)
MCH RBC QN AUTO: 29.5 PG (ref 27–31)
MCHC RBC AUTO-ENTMCNC: 34.4 G/DL (ref 33–37)
MCV RBC AUTO: 85.8 FL (ref 80–94)
MONOCYTES # BLD AUTO: 0.7 10*3/UL (ref 0.1–1)
MONOCYTES NFR BLD MANUAL: 7 % (ref 3–9)
NEUT #: 6.8 10*3/UL (ref 2.3–7.9)
NEUT %: 73.1 % (ref 47–73)
NRBC BLD QL AUTO: 0 % (ref 0–0)
PHOSPHATE SERPL-MCNC: 2.3 MG/DL (ref 2.5–4.9)
PLATELET # BLD AUTO: 229 10*3/UL (ref 130–400)
PMV BLD AUTO: 12.6 FL (ref 9.6–12.3)
POTASSIUM SERPL-SCNC: 4 MMOL/L (ref 3.5–5.1)
PROT SERPL-MCNC: 5.7 GM/DL (ref 6.4–8.2)
RBC # BLD AUTO: 3.86 10*6/UL (ref 4.5–5.9)
SODIUM SERPL-SCNC: 136 MMOL/L (ref 136–145)
TRIGL SERPL-MCNC: 102 MG/DL (ref ?–150)
TSH SERPL DL<=0.005 MIU/L-ACNC: 3.26 UIU/ML (ref 0.36–4.75)
VITAMIN B12: 549 PG/ML (ref 247–911)
VLDLC SERPL CALC-MCNC: 20 MG/DL (ref 6–40)
WBC NRBC COR # BLD AUTO: 9.2 10*3/UL (ref 4.8–10.8)

## 2018-07-28 VITALS — SYSTOLIC BLOOD PRESSURE: 128 MMHG | DIASTOLIC BLOOD PRESSURE: 68 MMHG

## 2018-07-28 VITALS — SYSTOLIC BLOOD PRESSURE: 130 MMHG | DIASTOLIC BLOOD PRESSURE: 88 MMHG

## 2018-07-28 VITALS — DIASTOLIC BLOOD PRESSURE: 68 MMHG | SYSTOLIC BLOOD PRESSURE: 121 MMHG

## 2018-07-28 VITALS — DIASTOLIC BLOOD PRESSURE: 74 MMHG | SYSTOLIC BLOOD PRESSURE: 127 MMHG

## 2018-07-28 VITALS — DIASTOLIC BLOOD PRESSURE: 58 MMHG

## 2018-07-28 LAB
ALBUMIN SERPL-MCNC: 3.4 GM/DL (ref 3.1–4.5)
ALP SERPL-CCNC: 89 U/L (ref 45–117)
ALT SERPL W P-5'-P-CCNC: 22 U/L (ref 12–78)
AST SERPL-CCNC: 28 IU/L (ref 3–35)
BASOPHILS # BLD AUTO: 0.1 10*3/UL (ref 0–0.1)
BASOPHILS NFR BLD AUTO: 0.9 % (ref 0–1)
BUN SERPL-MCNC: 21 MG/DL (ref 7–24)
CHLORIDE SERPL-SCNC: 104 MMOL/L (ref 98–107)
CREAT SERPL-MCNC: 1.05 MG/DL (ref 0.7–1.3)
EOSINOPHIL # BLD AUTO: 0.2 10*3/UL (ref 0–0.4)
EOSINOPHIL # BLD AUTO: 2.4 % (ref 1–4)
ERYTHROCYTE [DISTWIDTH] IN BLOOD BY AUTOMATED COUNT: 13.2 % (ref 0–14.5)
HCT VFR BLD AUTO: 39.3 % (ref 42–52)
HGB BLD-MCNC: 13.6 G/DL (ref 14–18)
LYMPHOCYTES # BLD AUTO: 1.5 10*3/UL (ref 1.3–4.4)
LYMPHOCYTES NFR BLD AUTO: 15.2 % (ref 27–41)
MCH RBC QN AUTO: 30 PG (ref 27–31)
MCHC RBC AUTO-ENTMCNC: 34.6 G/DL (ref 33–37)
MCV RBC AUTO: 86.8 FL (ref 80–94)
MONOCYTES # BLD AUTO: 0.8 10*3/UL (ref 0.1–1)
MONOCYTES NFR BLD MANUAL: 7.9 % (ref 3–9)
NEUT #: 7.3 10*3/UL (ref 2.3–7.9)
NEUT %: 73.1 % (ref 47–73)
NRBC BLD QL AUTO: 0 % (ref 0–0)
PLATELET # BLD AUTO: 272 10*3/UL (ref 130–400)
PMV BLD AUTO: 12.4 FL (ref 9.6–12.3)
POTASSIUM SERPL-SCNC: 3.5 MMOL/L (ref 3.5–5.1)
PROT SERPL-MCNC: 6.8 GM/DL (ref 6.4–8.2)
RBC # BLD AUTO: 4.53 10*6/UL (ref 4.5–5.9)
SODIUM SERPL-SCNC: 137 MMOL/L (ref 136–145)
WBC NRBC COR # BLD AUTO: 10 10*3/UL (ref 4.8–10.8)

## 2018-07-29 VITALS — DIASTOLIC BLOOD PRESSURE: 72 MMHG | SYSTOLIC BLOOD PRESSURE: 121 MMHG

## 2018-07-29 VITALS — DIASTOLIC BLOOD PRESSURE: 76 MMHG

## 2018-07-29 VITALS — DIASTOLIC BLOOD PRESSURE: 70 MMHG | SYSTOLIC BLOOD PRESSURE: 117 MMHG

## 2018-07-29 VITALS — DIASTOLIC BLOOD PRESSURE: 70 MMHG | SYSTOLIC BLOOD PRESSURE: 113 MMHG

## 2018-07-29 VITALS — DIASTOLIC BLOOD PRESSURE: 65 MMHG

## 2018-07-29 LAB
BASOPHILS # BLD AUTO: 0.1 10*3/UL (ref 0–0.1)
BASOPHILS NFR BLD AUTO: 1 % (ref 0–1)
BUN SERPL-MCNC: 23 MG/DL (ref 7–24)
CHLORIDE SERPL-SCNC: 104 MMOL/L (ref 98–107)
CREAT SERPL-MCNC: 1.07 MG/DL (ref 0.7–1.3)
EOSINOPHIL # BLD AUTO: 0.3 10*3/UL (ref 0–0.4)
EOSINOPHIL # BLD AUTO: 4.4 % (ref 1–4)
ERYTHROCYTE [DISTWIDTH] IN BLOOD BY AUTOMATED COUNT: 13.7 % (ref 0–14.5)
HCT VFR BLD AUTO: 38.6 % (ref 42–52)
HGB BLD-MCNC: 13 G/DL (ref 14–18)
LYMPHOCYTES # BLD AUTO: 1.7 10*3/UL (ref 1.3–4.4)
LYMPHOCYTES NFR BLD AUTO: 21.3 % (ref 27–41)
MCH RBC QN AUTO: 29.5 PG (ref 27–31)
MCHC RBC AUTO-ENTMCNC: 33.7 G/DL (ref 33–37)
MCV RBC AUTO: 87.7 FL (ref 80–94)
MONOCYTES # BLD AUTO: 0.8 10*3/UL (ref 0.1–1)
MONOCYTES NFR BLD MANUAL: 9.6 % (ref 3–9)
NEUT #: 4.9 10*3/UL (ref 2.3–7.9)
NEUT %: 63.3 % (ref 47–73)
NRBC BLD QL AUTO: 0 10*3/UL (ref 0–0)
PLATELET # BLD AUTO: 235 10*3/UL (ref 130–400)
PMV BLD AUTO: 12.4 FL (ref 9.6–12.3)
POTASSIUM SERPL-SCNC: 4 MMOL/L (ref 3.5–5.1)
RBC # BLD AUTO: 4.4 10*6/UL (ref 4.5–5.9)
SODIUM SERPL-SCNC: 137 MMOL/L (ref 136–145)
WBC NRBC COR # BLD AUTO: 7.8 10*3/UL (ref 4.8–10.8)

## 2018-07-29 PROCEDURE — 4B02XSZ MEASUREMENT OF CARDIAC PACEMAKER, EXTERNAL APPROACH: ICD-10-PCS | Performed by: INTERNAL MEDICINE

## 2018-07-30 VITALS — SYSTOLIC BLOOD PRESSURE: 94 MMHG | DIASTOLIC BLOOD PRESSURE: 58 MMHG

## 2018-07-30 VITALS — SYSTOLIC BLOOD PRESSURE: 102 MMHG | DIASTOLIC BLOOD PRESSURE: 92 MMHG

## 2018-07-30 VITALS — DIASTOLIC BLOOD PRESSURE: 64 MMHG

## 2018-07-30 LAB
BASOPHILS # BLD AUTO: 0.1 10*3/UL (ref 0–0.1)
BASOPHILS NFR BLD AUTO: 1.2 % (ref 0–1)
BUN SERPL-MCNC: 25 MG/DL (ref 7–24)
CHLORIDE SERPL-SCNC: 105 MMOL/L (ref 98–107)
CREAT SERPL-MCNC: 1.22 MG/DL (ref 0.7–1.3)
EOSINOPHIL # BLD AUTO: 0.5 10*3/UL (ref 0–0.4)
EOSINOPHIL # BLD AUTO: 5.3 % (ref 1–4)
ERYTHROCYTE [DISTWIDTH] IN BLOOD BY AUTOMATED COUNT: 13.7 % (ref 0–14.5)
HCT VFR BLD AUTO: 38.7 % (ref 42–52)
HGB BLD-MCNC: 13 G/DL (ref 14–18)
LYMPHOCYTES # BLD AUTO: 2.1 10*3/UL (ref 1.3–4.4)
LYMPHOCYTES NFR BLD AUTO: 24.8 % (ref 27–41)
MCH RBC QN AUTO: 29.7 PG (ref 27–31)
MCHC RBC AUTO-ENTMCNC: 33.6 G/DL (ref 33–37)
MCV RBC AUTO: 88.6 FL (ref 80–94)
MONOCYTES # BLD AUTO: 0.8 10*3/UL (ref 0.1–1)
MONOCYTES NFR BLD MANUAL: 9.2 % (ref 3–9)
NEUT #: 5 10*3/UL (ref 2.3–7.9)
NEUT %: 59.3 % (ref 47–73)
NRBC BLD QL AUTO: 0 % (ref 0–0)
PLATELET # BLD AUTO: 239 10*3/UL (ref 130–400)
PMV BLD AUTO: 12.2 FL (ref 9.6–12.3)
POTASSIUM SERPL-SCNC: 4 MMOL/L (ref 3.5–5.1)
RBC # BLD AUTO: 4.37 10*6/UL (ref 4.5–5.9)
SODIUM SERPL-SCNC: 138 MMOL/L (ref 136–145)
WBC NRBC COR # BLD AUTO: 8.5 10*3/UL (ref 4.8–10.8)

## 2018-08-08 ENCOUNTER — HOSPITAL ENCOUNTER (EMERGENCY)
Dept: HOSPITAL 83 - ED | Age: 83
Discharge: HOME | End: 2018-08-08
Payer: MEDICARE

## 2018-08-08 VITALS — HEIGHT: 60 IN

## 2018-08-08 VITALS — DIASTOLIC BLOOD PRESSURE: 80 MMHG

## 2018-08-08 DIAGNOSIS — I25.10: ICD-10-CM

## 2018-08-08 DIAGNOSIS — Z79.899: ICD-10-CM

## 2018-08-08 DIAGNOSIS — E11.22: ICD-10-CM

## 2018-08-08 DIAGNOSIS — Z88.8: ICD-10-CM

## 2018-08-08 DIAGNOSIS — E03.9: ICD-10-CM

## 2018-08-08 DIAGNOSIS — I25.2: ICD-10-CM

## 2018-08-08 DIAGNOSIS — N18.3: ICD-10-CM

## 2018-08-08 DIAGNOSIS — Z86.73: ICD-10-CM

## 2018-08-08 DIAGNOSIS — Z88.6: ICD-10-CM

## 2018-08-08 DIAGNOSIS — I50.9: ICD-10-CM

## 2018-08-08 DIAGNOSIS — E11.65: Primary | ICD-10-CM

## 2018-08-08 DIAGNOSIS — Z79.82: ICD-10-CM

## 2018-08-08 DIAGNOSIS — Z91.041: ICD-10-CM

## 2018-08-08 DIAGNOSIS — E78.5: ICD-10-CM

## 2018-08-08 DIAGNOSIS — I13.0: ICD-10-CM

## 2018-08-08 LAB
APPEARANCE UR: (no result)
BACTERIA #/AREA URNS HPF: (no result) /[HPF]
BASOPHILS # BLD AUTO: 0.1 10*3/UL (ref 0–0.1)
BASOPHILS NFR BLD AUTO: 1 % (ref 0–1)
BILIRUB UR QL STRIP: NEGATIVE
BUN SERPL-MCNC: 26 MG/DL (ref 7–24)
CHLORIDE SERPL-SCNC: 98 MMOL/L (ref 98–107)
COLOR UR: YELLOW
CREAT SERPL-MCNC: 1.33 MG/DL (ref 0.7–1.3)
EOSINOPHIL # BLD AUTO: 0.4 10*3/UL (ref 0–0.4)
EOSINOPHIL # BLD AUTO: 4.7 % (ref 1–4)
EPI CELLS #/AREA URNS HPF: (no result) /[HPF]
ERYTHROCYTE [DISTWIDTH] IN BLOOD BY AUTOMATED COUNT: 13.5 % (ref 0–14.5)
GLUCOSE UR QL: (no result)
HCT VFR BLD AUTO: 39 % (ref 42–52)
HGB BLD-MCNC: 12.9 G/DL (ref 14–18)
HGB UR QL STRIP: NEGATIVE
KETONES UR QL STRIP: NEGATIVE
LEUKOCYTE ESTERASE UR QL STRIP: NEGATIVE
LYMPHOCYTES # BLD AUTO: 1.2 10*3/UL (ref 1.3–4.4)
LYMPHOCYTES NFR BLD AUTO: 15.5 % (ref 27–41)
MCH RBC QN AUTO: 29.9 PG (ref 27–31)
MCHC RBC AUTO-ENTMCNC: 33.1 G/DL (ref 33–37)
MCV RBC AUTO: 90.5 FL (ref 80–94)
MONOCYTES # BLD AUTO: 0.6 10*3/UL (ref 0.1–1)
MONOCYTES NFR BLD MANUAL: 7.7 % (ref 3–9)
NEUT #: 5.6 10*3/UL (ref 2.3–7.9)
NEUT %: 70.8 % (ref 47–73)
NITRITE UR QL STRIP: NEGATIVE
NRBC BLD QL AUTO: 0 % (ref 0–0)
PH UR STRIP: 7 [PH] (ref 5–9)
PLATELET # BLD AUTO: 272 10*3/UL (ref 130–400)
PMV BLD AUTO: 11.1 FL (ref 9.6–12.3)
POTASSIUM SERPL-SCNC: 4.8 MMOL/L (ref 3.5–5.1)
RBC # BLD AUTO: 4.31 10*6/UL (ref 4.5–5.9)
RBC #/AREA URNS HPF: (no result) RBC/HPF (ref 0–2)
SODIUM SERPL-SCNC: 133 MMOL/L (ref 136–145)
SP GR UR: <= 1.005 (ref 1–1.03)
UROBILINOGEN UR STRIP-MCNC: 0.2 E.U./DL (ref 0.2–1)
WBC #/AREA URNS HPF: (no result) WBC/HPF (ref 0–5)
WBC NRBC COR # BLD AUTO: 7.9 10*3/UL (ref 4.8–10.8)

## 2019-02-05 ENCOUNTER — HOSPITAL ENCOUNTER (INPATIENT)
Dept: HOSPITAL 83 - ED | Age: 84
LOS: 1 days | Discharge: HOME | DRG: 682 | End: 2019-02-06
Attending: INTERNAL MEDICINE | Admitting: INTERNAL MEDICINE
Payer: MEDICARE

## 2019-02-05 VITALS — BODY MASS INDEX: 18.83 KG/M2 | WEIGHT: 131.56 LBS | HEIGHT: 70 IN

## 2019-02-05 VITALS — DIASTOLIC BLOOD PRESSURE: 67 MMHG

## 2019-02-05 VITALS — DIASTOLIC BLOOD PRESSURE: 62 MMHG | SYSTOLIC BLOOD PRESSURE: 94 MMHG

## 2019-02-05 VITALS — DIASTOLIC BLOOD PRESSURE: 57 MMHG

## 2019-02-05 VITALS — DIASTOLIC BLOOD PRESSURE: 72 MMHG

## 2019-02-05 VITALS — SYSTOLIC BLOOD PRESSURE: 108 MMHG | DIASTOLIC BLOOD PRESSURE: 60 MMHG

## 2019-02-05 DIAGNOSIS — I13.0: ICD-10-CM

## 2019-02-05 DIAGNOSIS — E44.0: ICD-10-CM

## 2019-02-05 DIAGNOSIS — Z95.5: ICD-10-CM

## 2019-02-05 DIAGNOSIS — N18.3: ICD-10-CM

## 2019-02-05 DIAGNOSIS — Z82.3: ICD-10-CM

## 2019-02-05 DIAGNOSIS — I50.32: ICD-10-CM

## 2019-02-05 DIAGNOSIS — E11.22: ICD-10-CM

## 2019-02-05 DIAGNOSIS — Z79.82: ICD-10-CM

## 2019-02-05 DIAGNOSIS — Z91.048: ICD-10-CM

## 2019-02-05 DIAGNOSIS — E78.2: ICD-10-CM

## 2019-02-05 DIAGNOSIS — Z88.6: ICD-10-CM

## 2019-02-05 DIAGNOSIS — Z95.1: ICD-10-CM

## 2019-02-05 DIAGNOSIS — I95.1: ICD-10-CM

## 2019-02-05 DIAGNOSIS — Z86.73: ICD-10-CM

## 2019-02-05 DIAGNOSIS — I25.2: ICD-10-CM

## 2019-02-05 DIAGNOSIS — I25.10: ICD-10-CM

## 2019-02-05 DIAGNOSIS — Z80.0: ICD-10-CM

## 2019-02-05 DIAGNOSIS — E03.9: ICD-10-CM

## 2019-02-05 DIAGNOSIS — Z83.3: ICD-10-CM

## 2019-02-05 DIAGNOSIS — Z91.041: ICD-10-CM

## 2019-02-05 DIAGNOSIS — Z79.02: ICD-10-CM

## 2019-02-05 DIAGNOSIS — Z95.0: ICD-10-CM

## 2019-02-05 DIAGNOSIS — Z88.8: ICD-10-CM

## 2019-02-05 DIAGNOSIS — N17.0: Primary | ICD-10-CM

## 2019-02-05 DIAGNOSIS — E86.0: ICD-10-CM

## 2019-02-05 DIAGNOSIS — Z79.4: ICD-10-CM

## 2019-02-05 DIAGNOSIS — G93.41: ICD-10-CM

## 2019-02-05 DIAGNOSIS — Z79.899: ICD-10-CM

## 2019-02-05 LAB
ALBUMIN SERPL-MCNC: 3.7 GM/DL (ref 3.1–4.5)
ALP SERPL-CCNC: 72 U/L (ref 45–117)
ALT SERPL W P-5'-P-CCNC: 17 U/L (ref 12–78)
APPEARANCE UR: CLEAR
APTT PPP: 21.8 SECONDS (ref 20.8–31.5)
AST SERPL-CCNC: 10 IU/L (ref 3–35)
BACTERIA #/AREA URNS HPF: (no result) /[HPF]
BASOPHILS # BLD AUTO: 0.1 10*3/UL (ref 0–0.1)
BASOPHILS NFR BLD AUTO: 0.6 % (ref 0–1)
BILIRUB UR QL STRIP: NEGATIVE
BUN SERPL-MCNC: 35 MG/DL (ref 7–24)
CASTS URNS QL MICRO: (no result)
CHLORIDE SERPL-SCNC: 97 MMOL/L (ref 98–107)
COLOR UR: YELLOW
CREAT SERPL-MCNC: 1.88 MG/DL (ref 0.7–1.3)
EOSINOPHIL # BLD AUTO: 0.2 10*3/UL (ref 0–0.4)
EOSINOPHIL # BLD AUTO: 2 % (ref 1–4)
EPI CELLS #/AREA URNS HPF: (no result) /[HPF]
ERYTHROCYTE [DISTWIDTH] IN BLOOD BY AUTOMATED COUNT: 12.7 % (ref 0–14.5)
GLUCOSE UR QL: (no result)
HCT VFR BLD AUTO: 40.7 % (ref 42–52)
HGB BLD-MCNC: 14.8 G/DL (ref 14–18)
HGB UR QL STRIP: NEGATIVE
INR BLD: 1 (ref 2–3.5)
KETONES UR QL STRIP: (no result)
LEUKOCYTE ESTERASE UR QL STRIP: (no result)
LYMPHOCYTES # BLD AUTO: 1.3 10*3/UL (ref 1.3–4.4)
LYMPHOCYTES NFR BLD AUTO: 14.3 % (ref 27–41)
MCH RBC QN AUTO: 31.5 PG (ref 27–31)
MCHC RBC AUTO-ENTMCNC: 36.4 G/DL (ref 33–37)
MCV RBC AUTO: 86.6 FL (ref 80–94)
MONOCYTES # BLD AUTO: 0.7 10*3/UL (ref 0.1–1)
MONOCYTES NFR BLD MANUAL: 7.6 % (ref 3–9)
MUCOUS THREADS URNS QL MICRO: (no result)
NEUT #: 7.1 10*3/UL (ref 2.3–7.9)
NEUT %: 75.1 % (ref 47–73)
NITRITE UR QL STRIP: NEGATIVE
NRBC BLD QL AUTO: 0 10*3/UL (ref 0–0)
PH UR STRIP: 6 [PH] (ref 5–9)
PLATELET # BLD AUTO: 255 10*3/UL (ref 130–400)
PMV BLD AUTO: 11.7 FL (ref 9.6–12.3)
POTASSIUM SERPL-SCNC: 3.5 MMOL/L (ref 3.5–5.1)
PROT SERPL-MCNC: 7 GM/DL (ref 6.4–8.2)
RBC # BLD AUTO: 4.7 10*6/UL (ref 4.5–5.9)
RBC #/AREA URNS HPF: (no result) RBC/HPF (ref 0–2)
SODIUM SERPL-SCNC: 133 MMOL/L (ref 136–145)
SP GR UR: 1.01 (ref 1–1.03)
TROPONIN I SERPL-MCNC: 0.04 NG/ML (ref ?–0.04)
UROBILINOGEN UR STRIP-MCNC: 1 E.U./DL (ref 0.2–1)
WBC #/AREA URNS HPF: (no result) WBC/HPF (ref 0–5)
WBC NRBC COR # BLD AUTO: 9.4 10*3/UL (ref 4.8–10.8)
YEAST #/AREA URNS HPF: (no result) /[HPF]

## 2019-02-06 VITALS — DIASTOLIC BLOOD PRESSURE: 53 MMHG

## 2019-02-06 VITALS — DIASTOLIC BLOOD PRESSURE: 60 MMHG

## 2019-02-06 LAB
25(OH)D3 SERPL-MCNC: 24.6 NG/ML (ref 30–100)
ALBUMIN SERPL-MCNC: 3.1 GM/DL (ref 3.1–4.5)
ALP SERPL-CCNC: 60 U/L (ref 45–117)
ALT SERPL W P-5'-P-CCNC: 14 U/L (ref 12–78)
AST SERPL-CCNC: 14 IU/L (ref 3–35)
BASOPHILS # BLD AUTO: 0.1 10*3/UL (ref 0–0.1)
BASOPHILS NFR BLD AUTO: 1 % (ref 0–1)
BUN SERPL-MCNC: 29 MG/DL (ref 7–24)
CHLORIDE SERPL-SCNC: 101 MMOL/L (ref 98–107)
CHOLEST SERPL-MCNC: 104 MG/DL (ref ?–200)
CREAT SERPL-MCNC: 1.27 MG/DL (ref 0.7–1.3)
EOSINOPHIL # BLD AUTO: 0.3 10*3/UL (ref 0–0.4)
EOSINOPHIL # BLD AUTO: 3.5 % (ref 1–4)
ERYTHROCYTE [DISTWIDTH] IN BLOOD BY AUTOMATED COUNT: 12.8 % (ref 0–14.5)
HCT VFR BLD AUTO: 35.6 % (ref 42–52)
HDLC SERPL-MCNC: 32 MG/DL (ref 40–60)
HGB BLD-MCNC: 12.4 G/DL (ref 14–18)
LDLC SERPL DIRECT ASSAY-MCNC: 48 MG/DL (ref 9–159)
LYMPHOCYTES # BLD AUTO: 1.9 10*3/UL (ref 1.3–4.4)
LYMPHOCYTES NFR BLD AUTO: 19.9 % (ref 27–41)
MCH RBC QN AUTO: 31.1 PG (ref 27–31)
MCHC RBC AUTO-ENTMCNC: 34.8 G/DL (ref 33–37)
MCV RBC AUTO: 89.2 FL (ref 80–94)
MONOCYTES # BLD AUTO: 0.8 10*3/UL (ref 0.1–1)
MONOCYTES NFR BLD MANUAL: 8.4 % (ref 3–9)
NEUT #: 6.3 10*3/UL (ref 2.3–7.9)
NEUT %: 66.9 % (ref 47–73)
NRBC BLD QL AUTO: 0 % (ref 0–0)
PHOSPHATE SERPL-MCNC: 3.4 MG/DL (ref 2.5–4.9)
PLATELET # BLD AUTO: 203 10*3/UL (ref 130–400)
PMV BLD AUTO: 12.3 FL (ref 9.6–12.3)
POTASSIUM SERPL-SCNC: 3.4 MMOL/L (ref 3.5–5.1)
PROT SERPL-MCNC: 5.9 GM/DL (ref 6.4–8.2)
RBC # BLD AUTO: 3.99 10*6/UL (ref 4.5–5.9)
SODIUM SERPL-SCNC: 135 MMOL/L (ref 136–145)
T4 FREE SERPL-MCNC: 1.34 NG/DL (ref 0.76–1.46)
TRIGL SERPL-MCNC: 121 MG/DL (ref ?–150)
TSH SERPL DL<=0.005 MIU/L-ACNC: 0.95 UIU/ML (ref 0.36–4.75)
VITAMIN B12: 821 PG/ML (ref 193–986)
VLDLC SERPL CALC-MCNC: 24 MG/DL (ref 6–40)
WBC NRBC COR # BLD AUTO: 9.4 10*3/UL (ref 4.8–10.8)

## 2019-03-12 LAB
LV EF: 45 %
LVEF MODALITY: NORMAL

## 2019-04-28 ENCOUNTER — HOSPITAL ENCOUNTER (EMERGENCY)
Dept: HOSPITAL 83 - ED | Age: 84
Discharge: ANOTHER HOSPITAL | End: 2019-04-28
Payer: MEDICARE

## 2019-04-28 VITALS — DIASTOLIC BLOOD PRESSURE: 76 MMHG

## 2019-04-28 VITALS — BODY MASS INDEX: 23.3 KG/M2 | HEIGHT: 71.97 IN | WEIGHT: 172 LBS

## 2019-04-28 DIAGNOSIS — Z95.1: ICD-10-CM

## 2019-04-28 DIAGNOSIS — Z86.73: ICD-10-CM

## 2019-04-28 DIAGNOSIS — E78.5: ICD-10-CM

## 2019-04-28 DIAGNOSIS — Z88.8: ICD-10-CM

## 2019-04-28 DIAGNOSIS — I25.10: ICD-10-CM

## 2019-04-28 DIAGNOSIS — Z88.6: ICD-10-CM

## 2019-04-28 DIAGNOSIS — I50.9: ICD-10-CM

## 2019-04-28 DIAGNOSIS — Z79.4: ICD-10-CM

## 2019-04-28 DIAGNOSIS — I63.9: Primary | ICD-10-CM

## 2019-04-28 DIAGNOSIS — I25.2: ICD-10-CM

## 2019-04-28 DIAGNOSIS — E11.22: ICD-10-CM

## 2019-04-28 DIAGNOSIS — Z86.718: ICD-10-CM

## 2019-04-28 DIAGNOSIS — E03.9: ICD-10-CM

## 2019-04-28 DIAGNOSIS — Z79.899: ICD-10-CM

## 2019-04-28 DIAGNOSIS — Z91.041: ICD-10-CM

## 2019-04-28 DIAGNOSIS — N18.3: ICD-10-CM

## 2019-04-28 DIAGNOSIS — Z95.0: ICD-10-CM

## 2019-04-28 DIAGNOSIS — I13.0: ICD-10-CM

## 2019-04-28 LAB
ALBUMIN SERPL-MCNC: 3.5 GM/DL (ref 3.1–4.5)
ALP SERPL-CCNC: 93 U/L (ref 45–117)
ALT SERPL W P-5'-P-CCNC: 21 U/L (ref 12–78)
AST SERPL-CCNC: 23 IU/L (ref 3–35)
BASOPHILS # BLD AUTO: 0.1 10*3/UL (ref 0–0.1)
BASOPHILS NFR BLD AUTO: 0.6 % (ref 0–1)
BUN SERPL-MCNC: 33 MG/DL (ref 7–24)
CHLORIDE SERPL-SCNC: 99 MMOL/L (ref 98–107)
CREAT SERPL-MCNC: 1.73 MG/DL (ref 0.7–1.3)
EOSINOPHIL # BLD AUTO: 0.3 10*3/UL (ref 0–0.4)
EOSINOPHIL # BLD AUTO: 3.2 % (ref 1–4)
ERYTHROCYTE [DISTWIDTH] IN BLOOD BY AUTOMATED COUNT: 13.4 % (ref 0–14.5)
HCT VFR BLD AUTO: 35.2 % (ref 42–52)
HGB BLD-MCNC: 11.7 G/DL (ref 14–18)
LYMPHOCYTES # BLD AUTO: 1.1 10*3/UL (ref 1.3–4.4)
LYMPHOCYTES NFR BLD AUTO: 13 % (ref 27–41)
MCH RBC QN AUTO: 30.1 PG (ref 27–31)
MCHC RBC AUTO-ENTMCNC: 33.2 G/DL (ref 33–37)
MCV RBC AUTO: 90.5 FL (ref 80–94)
MONOCYTES # BLD AUTO: 1 10*3/UL (ref 0.1–1)
MONOCYTES NFR BLD MANUAL: 11.8 % (ref 3–9)
NEUT #: 6 10*3/UL (ref 2.3–7.9)
NEUT %: 71.2 % (ref 47–73)
NRBC BLD QL AUTO: 0 % (ref 0–0)
PLATELET # BLD AUTO: 317 10*3/UL (ref 130–400)
PMV BLD AUTO: 11.1 FL (ref 9.6–12.3)
POTASSIUM SERPL-SCNC: 4.8 MMOL/L (ref 3.5–5.1)
PROT SERPL-MCNC: 7.3 GM/DL (ref 6.4–8.2)
RBC # BLD AUTO: 3.89 10*6/UL (ref 4.5–5.9)
SODIUM SERPL-SCNC: 134 MMOL/L (ref 136–145)
TROPONIN I SERPL-MCNC: 0.04 NG/ML (ref ?–0.04)
WBC NRBC COR # BLD AUTO: 8.5 10*3/UL (ref 4.8–10.8)

## 2019-11-09 ENCOUNTER — HOSPITAL ENCOUNTER (EMERGENCY)
Dept: HOSPITAL 83 - ED | Age: 84
Discharge: HOME | End: 2019-11-09
Payer: COMMERCIAL

## 2019-11-09 VITALS — SYSTOLIC BLOOD PRESSURE: 150 MMHG | DIASTOLIC BLOOD PRESSURE: 76 MMHG

## 2019-11-09 VITALS — BODY MASS INDEX: 20.05 KG/M2 | WEIGHT: 148 LBS | HEIGHT: 71.97 IN

## 2019-11-09 DIAGNOSIS — Z79.899: ICD-10-CM

## 2019-11-09 DIAGNOSIS — I10: ICD-10-CM

## 2019-11-09 DIAGNOSIS — Z88.8: ICD-10-CM

## 2019-11-09 DIAGNOSIS — Z79.4: ICD-10-CM
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
## 2019-11-09 DIAGNOSIS — E11.9: ICD-10-CM

## 2019-11-09 DIAGNOSIS — Z91.041: ICD-10-CM
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
## 2019-11-09 DIAGNOSIS — Z79.82: ICD-10-CM

## 2019-11-09 DIAGNOSIS — S09.90XA: Primary | ICD-10-CM

## 2019-11-09 DIAGNOSIS — Z86.718: ICD-10-CM

## 2019-11-09 DIAGNOSIS — Z88.6: ICD-10-CM

## 2020-03-12 ENCOUNTER — HOSPITAL ENCOUNTER (EMERGENCY)
Dept: HOSPITAL 83 - ED | Age: 85
Discharge: HOME | End: 2020-03-12
Payer: OTHER GOVERNMENT

## 2020-03-12 VITALS — DIASTOLIC BLOOD PRESSURE: 70 MMHG

## 2020-03-12 VITALS — HEIGHT: 60 IN

## 2020-03-12 DIAGNOSIS — E11.22: ICD-10-CM

## 2020-03-12 DIAGNOSIS — Z95.0: ICD-10-CM
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
## 2020-03-12 DIAGNOSIS — Z88.6: ICD-10-CM

## 2020-03-12 DIAGNOSIS — Z79.899: ICD-10-CM
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
## 2020-03-12 DIAGNOSIS — S00.83XA: Primary | ICD-10-CM

## 2020-03-12 DIAGNOSIS — Z86.73: ICD-10-CM

## 2020-03-12 DIAGNOSIS — E78.5: ICD-10-CM

## 2020-03-12 DIAGNOSIS — Z91.041: ICD-10-CM

## 2020-03-12 DIAGNOSIS — I25.10: ICD-10-CM

## 2020-03-12 DIAGNOSIS — E03.9: ICD-10-CM

## 2020-03-12 DIAGNOSIS — I25.2: ICD-10-CM

## 2020-03-12 DIAGNOSIS — H11.32: ICD-10-CM

## 2020-03-12 DIAGNOSIS — I48.91: ICD-10-CM
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
## 2020-03-12 DIAGNOSIS — Z88.8: ICD-10-CM

## 2020-03-12 DIAGNOSIS — Z98.61: ICD-10-CM

## 2020-03-12 DIAGNOSIS — Z79.4: ICD-10-CM

## 2020-03-12 DIAGNOSIS — I13.0: ICD-10-CM
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
## 2020-03-12 DIAGNOSIS — N18.3: ICD-10-CM

## 2020-03-12 DIAGNOSIS — I50.32: ICD-10-CM

## 2020-03-12 LAB
APTT PPP: 28.9 SECONDS (ref 20–32.1)
BASOPHILS # BLD AUTO: 0.1 10*3/UL (ref 0–0.1)
BASOPHILS NFR BLD AUTO: 0.9 % (ref 0–1)
BUN SERPL-MCNC: 25 MG/DL (ref 7–24)
CHLORIDE SERPL-SCNC: 99 MMOL/L (ref 98–107)
CREAT SERPL-MCNC: 1.63 MG/DL (ref 0.7–1.3)
EOSINOPHIL # BLD AUTO: 0.4 10*3/UL (ref 0–0.4)
EOSINOPHIL # BLD AUTO: 4.8 % (ref 1–4)
ERYTHROCYTE [DISTWIDTH] IN BLOOD BY AUTOMATED COUNT: 13.2 % (ref 0–14.5)
HCT VFR BLD AUTO: 34.8 % (ref 42–52)
HGB BLD-MCNC: 11.6 G/DL (ref 14–18)
INR BLD: 0.9 (ref 2–3.5)
LYMPHOCYTES # BLD AUTO: 0.9 10*3/UL (ref 1.3–4.4)
LYMPHOCYTES NFR BLD AUTO: 9.5 % (ref 27–41)
MCH RBC QN AUTO: 30 PG (ref 27–31)
MCHC RBC AUTO-ENTMCNC: 33.3 G/DL (ref 33–37)
MCV RBC AUTO: 89.9 FL (ref 80–94)
MONOCYTES # BLD AUTO: 0.9 10*3/UL (ref 0.1–1)
MONOCYTES NFR BLD MANUAL: 9.9 % (ref 3–9)
NEUT #: 6.8 10*3/UL (ref 2.3–7.9)
NEUT %: 74.6 % (ref 47–73)
NRBC BLD QL AUTO: 0 % (ref 0–0)
PLATELET # BLD AUTO: 248 10*3/UL (ref 130–400)
PMV BLD AUTO: 11.1 FL (ref 9.6–12.3)
POTASSIUM SERPL-SCNC: 4.6 MMOL/L (ref 3.5–5.1)
RBC # BLD AUTO: 3.87 10*6/UL (ref 4.5–5.9)
SODIUM SERPL-SCNC: 131 MMOL/L (ref 136–145)
WBC NRBC COR # BLD AUTO: 9.2 10*3/UL (ref 4.8–10.8)

## 2020-05-12 ENCOUNTER — HOSPITAL ENCOUNTER (INPATIENT)
Dept: HOSPITAL 83 - ED | Age: 85
LOS: 2 days | Discharge: NURSING, REHAB OR LONG-TERM CARE | DRG: 637 | End: 2020-05-14
Attending: INTERNAL MEDICINE | Admitting: INTERNAL MEDICINE
Payer: MEDICARE

## 2020-05-12 VITALS — SYSTOLIC BLOOD PRESSURE: 153 MMHG | DIASTOLIC BLOOD PRESSURE: 73 MMHG

## 2020-05-12 VITALS — DIASTOLIC BLOOD PRESSURE: 64 MMHG

## 2020-05-12 VITALS — DIASTOLIC BLOOD PRESSURE: 80 MMHG

## 2020-05-12 VITALS — DIASTOLIC BLOOD PRESSURE: 78 MMHG

## 2020-05-12 VITALS — WEIGHT: 155.6 LBS | BODY MASS INDEX: 23.05 KG/M2 | HEIGHT: 68.98 IN

## 2020-05-12 VITALS — DIASTOLIC BLOOD PRESSURE: 73 MMHG

## 2020-05-12 VITALS — DIASTOLIC BLOOD PRESSURE: 68 MMHG | SYSTOLIC BLOOD PRESSURE: 148 MMHG

## 2020-05-12 DIAGNOSIS — N18.3: ICD-10-CM

## 2020-05-12 DIAGNOSIS — G93.41: ICD-10-CM

## 2020-05-12 DIAGNOSIS — Z88.8: ICD-10-CM

## 2020-05-12 DIAGNOSIS — E55.9: ICD-10-CM

## 2020-05-12 DIAGNOSIS — Z66: ICD-10-CM

## 2020-05-12 DIAGNOSIS — Z51.5: ICD-10-CM

## 2020-05-12 DIAGNOSIS — Z80.0: ICD-10-CM

## 2020-05-12 DIAGNOSIS — I50.32: ICD-10-CM

## 2020-05-12 DIAGNOSIS — E11.649: Primary | ICD-10-CM

## 2020-05-12 DIAGNOSIS — Z95.0: ICD-10-CM

## 2020-05-12 DIAGNOSIS — Z82.3: ICD-10-CM

## 2020-05-12 DIAGNOSIS — I13.0: ICD-10-CM

## 2020-05-12 DIAGNOSIS — Z91.041: ICD-10-CM

## 2020-05-12 DIAGNOSIS — E11.22: ICD-10-CM

## 2020-05-12 DIAGNOSIS — I71.2: ICD-10-CM

## 2020-05-12 DIAGNOSIS — I25.10: ICD-10-CM

## 2020-05-12 DIAGNOSIS — Z95.1: ICD-10-CM

## 2020-05-12 DIAGNOSIS — D64.9: ICD-10-CM

## 2020-05-12 DIAGNOSIS — R79.82: ICD-10-CM

## 2020-05-12 DIAGNOSIS — Z83.3: ICD-10-CM

## 2020-05-12 DIAGNOSIS — L91.9: ICD-10-CM

## 2020-05-12 DIAGNOSIS — G47.00: ICD-10-CM

## 2020-05-12 DIAGNOSIS — E03.9: ICD-10-CM

## 2020-05-12 DIAGNOSIS — I35.0: ICD-10-CM

## 2020-05-12 DIAGNOSIS — E43: ICD-10-CM

## 2020-05-12 LAB
ALBUMIN SERPL-MCNC: 2.7 GM/DL (ref 3.1–4.5)
ALP SERPL-CCNC: 73 U/L (ref 45–117)
ALT SERPL W P-5'-P-CCNC: 14 U/L (ref 12–78)
APPEARANCE UR: CLEAR
APTT PPP: 33.3 SECONDS (ref 20–32.1)
AST SERPL-CCNC: 19 IU/L (ref 3–35)
BASOPHILS # BLD AUTO: 0.1 10*3/UL (ref 0–0.1)
BASOPHILS NFR BLD AUTO: 0.6 % (ref 0–1)
BILIRUB UR QL STRIP: NEGATIVE
BUN SERPL-MCNC: 15 MG/DL (ref 7–24)
CHLORIDE SERPL-SCNC: 102 MMOL/L (ref 98–107)
COLOR UR: YELLOW
CREAT SERPL-MCNC: 1.18 MG/DL (ref 0.7–1.3)
EOSINOPHIL # BLD AUTO: 0.2 10*3/UL (ref 0–0.4)
EOSINOPHIL # BLD AUTO: 1.5 % (ref 1–4)
ERYTHROCYTE [DISTWIDTH] IN BLOOD BY AUTOMATED COUNT: 12.9 % (ref 0–14.5)
GLUCOSE UR QL: NEGATIVE
HCT VFR BLD AUTO: 33.8 % (ref 42–52)
HGB UR QL STRIP: NEGATIVE
INR BLD: 1.1 (ref 2–3.5)
KETONES UR QL STRIP: NEGATIVE
LEUKOCYTE ESTERASE UR QL STRIP: NEGATIVE
LIPASE SERPL-CCNC: 27 U/L (ref 73–393)
LYMPHOCYTES # BLD AUTO: 1 10*3/UL (ref 1.3–4.4)
LYMPHOCYTES NFR BLD AUTO: 8.8 % (ref 27–41)
MCH RBC QN AUTO: 29 PG (ref 27–31)
MCHC RBC AUTO-ENTMCNC: 32.5 G/DL (ref 33–37)
MCV RBC AUTO: 89.2 FL (ref 80–94)
MONOCYTES # BLD AUTO: 1 10*3/UL (ref 0.1–1)
MONOCYTES NFR BLD MANUAL: 9.1 % (ref 3–9)
NEUT #: 8.7 10*3/UL (ref 2.3–7.9)
NEUT %: 79.6 % (ref 47–73)
NITRITE UR QL STRIP: NEGATIVE
NRBC BLD QL AUTO: 0 % (ref 0–0)
PH UR STRIP: 7.5 [PH] (ref 5–9)
PLATELET # BLD AUTO: 392 10*3/UL (ref 130–400)
PMV BLD AUTO: 10.1 FL (ref 9.6–12.3)
POTASSIUM SERPL-SCNC: 3.8 MMOL/L (ref 3.5–5.1)
PROT SERPL-MCNC: 6.9 GM/DL (ref 6.4–8.2)
RBC # BLD AUTO: 3.79 10*6/UL (ref 4.5–5.9)
RBC #/AREA URNS HPF: (no result) RBC/HPF (ref 0–2)
SODIUM SERPL-SCNC: 136 MMOL/L (ref 136–145)
SP GR UR: 1.01 (ref 1–1.03)
TROPONIN I SERPL-MCNC: 0.03 NG/ML (ref ?–0.04)
UROBILINOGEN UR STRIP-MCNC: 0.2 E.U./DL (ref 0.2–1)
WBC #/AREA URNS HPF: (no result) WBC/HPF (ref 0–5)
WBC NRBC COR # BLD AUTO: 10.9 10*3/UL (ref 4.8–10.8)

## 2020-05-12 NOTE — NUR
Time: 1115
A 89  year old MALE admitted to 
under services of REGINALD DENG DO.
Pt. arrived via stretcher from
ER.  Chief complaint: FALL WITH CHANGE IN MENTAL STATUS PER NURSING HOME STAFF
THIS MORNING.
 
HOWARD MCKNIGHT

## 2020-05-13 VITALS — DIASTOLIC BLOOD PRESSURE: 73 MMHG | SYSTOLIC BLOOD PRESSURE: 112 MMHG

## 2020-05-13 VITALS — DIASTOLIC BLOOD PRESSURE: 69 MMHG

## 2020-05-13 VITALS — DIASTOLIC BLOOD PRESSURE: 67 MMHG

## 2020-05-13 VITALS — DIASTOLIC BLOOD PRESSURE: 78 MMHG

## 2020-05-13 VITALS — DIASTOLIC BLOOD PRESSURE: 76 MMHG

## 2020-05-13 LAB
25(OH)D3 SERPL-MCNC: 13.5 NG/ML (ref 30–100)
ALBUMIN SERPL-MCNC: 2.3 GM/DL (ref 3.1–4.5)
ALP SERPL-CCNC: 63 U/L (ref 45–117)
ALT SERPL W P-5'-P-CCNC: 13 U/L (ref 12–78)
AST SERPL-CCNC: 20 IU/L (ref 3–35)
BASOPHILS # BLD AUTO: 0.1 10*3/UL (ref 0–0.1)
BASOPHILS NFR BLD AUTO: 0.9 % (ref 0–1)
BUN SERPL-MCNC: 13 MG/DL (ref 7–24)
CHLORIDE SERPL-SCNC: 103 MMOL/L (ref 98–107)
CHOLEST SERPL-MCNC: 110 MG/DL (ref ?–200)
CREAT SERPL-MCNC: 1.22 MG/DL (ref 0.7–1.3)
EOSINOPHIL # BLD AUTO: 0.5 10*3/UL (ref 0–0.4)
EOSINOPHIL # BLD AUTO: 6.2 % (ref 1–4)
ERYTHROCYTE [DISTWIDTH] IN BLOOD BY AUTOMATED COUNT: 12.9 % (ref 0–14.5)
HCT VFR BLD AUTO: 31.7 % (ref 42–52)
HDLC SERPL-MCNC: 39 MG/DL (ref 40–60)
LDLC SERPL DIRECT ASSAY-MCNC: 59 MG/DL (ref 9–159)
LYMPHOCYTES # BLD AUTO: 1.2 10*3/UL (ref 1.3–4.4)
LYMPHOCYTES NFR BLD AUTO: 14.7 % (ref 27–41)
MCH RBC QN AUTO: 28.7 PG (ref 27–31)
MCHC RBC AUTO-ENTMCNC: 32.5 G/DL (ref 33–37)
MCV RBC AUTO: 88.3 FL (ref 80–94)
MONOCYTES # BLD AUTO: 0.9 10*3/UL (ref 0.1–1)
MONOCYTES NFR BLD MANUAL: 11.4 % (ref 3–9)
NEUT #: 5.4 10*3/UL (ref 2.3–7.9)
NEUT %: 66.6 % (ref 47–73)
NRBC BLD QL AUTO: 0 % (ref 0–0)
PLATELET # BLD AUTO: 386 10*3/UL (ref 130–400)
PMV BLD AUTO: 10.8 FL (ref 9.6–12.3)
POTASSIUM SERPL-SCNC: 4 MMOL/L (ref 3.5–5.1)
PROT SERPL-MCNC: 6.2 GM/DL (ref 6.4–8.2)
RBC # BLD AUTO: 3.59 10*6/UL (ref 4.5–5.9)
SODIUM SERPL-SCNC: 134 MMOL/L (ref 136–145)
T4 FREE SERPL-MCNC: 1.08 NG/DL (ref 0.76–1.46)
TRIGL SERPL-MCNC: 58 MG/DL (ref ?–150)
TSH SERPL DL<=0.005 MIU/L-ACNC: 2.05 UIU/ML (ref 0.36–4.75)
VITAMIN B12: 371 PG/ML (ref 247–911)
VLDLC SERPL CALC-MCNC: 12 MG/DL (ref 6–40)
WBC NRBC COR # BLD AUTO: 8 10*3/UL (ref 4.8–10.8)

## 2020-05-13 PROCEDURE — 0HBMXZZ EXCISION OF RIGHT FOOT SKIN, EXTERNAL APPROACH: ICD-10-PCS | Performed by: PODIATRIST

## 2020-05-13 NOTE — NUR
PT RESIDES AT [facility] AND STATES HE WILL RETURN WHEN MEDICALLY
STABLE.  WILL CONTINUE TO FOLLOW.

## 2020-05-13 NOTE — NUR
PHYSICAL THERAPY
 
Physical Therapy evaluation completed on 4E with full evaluation to follow.
Low complexity PT evaluation per chart review and evaluation, [id number].
Recommend physical therapy per plan of care and return to assisted living
facility upon discharge.  Thank you for this referral. Kianna Arora,PT,DPT

## 2020-05-13 NOTE — NUR
MIHAI faxed clinical updates to [facility] on this date. Patient is a LTC
resident there and can return once medically stable.

## 2020-05-13 NOTE — NUR
LILI DELGADO [id number]
 
Please refer to the physician's history and physical for past medical history,
comorbid conditions, and allergies.
   Diagnosis: METABOLIC ENCEPHALOPATHY  HYPOGLYCEMIA
 
   David Score: 18,LOW OR NO RISK
 
WOUND DESCRIPTIONS:
Wound Number: 1
Location of the wound: right plantar aspect of foot base great toe
Type of wound: unstageable
Thickness: Full
Size: 2.6cm x 3.0cm x <0.1cm
Tunneling: none
Undermining: none
Sinus Tract: none
Presence of Exudate: none
Amount: None
Color: Brown, tan, yellow
Odor: None
Periwound Skin Appearance: Normal
Wound edges: approximated
Pain (associated with wound): none at time of assessment
How does patient state this happened? pt is unsure how long he had this area
   Surface the patient is resting on: Proform
 
SKIN PREVENTION RECOMMENDATION:
   1.  Pressure redistribution support surface as appropriate
   2.  Elevate heels
   3.  Remove boots/TEDS every shift and reapply
   4.  Head of bed 30 degrees as tolerated
   5.  Assess nutrition and hydration
   6.  Manage moisture
   7.  Avoid the use of containment devices while in bed
   8.  Use absorptive products on surfaces limit layers of linens on bed
   9.  Turn and reposition every 1-2 hours in bed and every 1 hour in chair as
       tolerated
   10. Weight shifts every 15 minutes while up in chair
   11. Offloading with pillows or device to keep heels elevated off bed
   12. Monitor skin at least every shift
   13. Inspect under medical devices twice a day
 
WOUND TREATMENT RECOMMENDATIONS:
Venous and arterail studies for non-healing area to right plantar aspect of
foot
Consult podiatry for possible debridment of right plantar aspect of foot
Unstageable guidelines: Apply sureprep to right plantar aspect of foot at the
base of great toe allow time to dry then cover with optifoam gentle every 2
days and prn for soiling
Heel raiser pro boots to bilateral feet while in bed.
Patient states he will care for this area when he returns home and doesn't
wish to follow up in an outpatient setting at this time.

## 2020-05-13 NOTE — NUR
Occupational Therapy evaluation completed on 4E with full evaluation to
follow. Patient complexity is low, [id number].  Recommend occupation Therapy per
plan of care and return to prior assisted living upon discharge. Thank you for
this referral. Cathy Bustamante OTR/L

## 2020-05-13 NOTE — NUR
ASSUMED CARE FOR THIS PT AT THIS TIME.  PT RESTING QUIETLY IN BED.  NO C/O
VOICED.  CALL LIGHT IN REACH.

## 2020-05-13 NOTE — NUR
0527 BSG 50 REPEATED TEST PER POLICY
0530 BSG 46 MD NOTIFIED
0540 DR. MARTINEZ NOTIFIED OF BSG RESULTS AND ORDER RECEIVED TO GIVEN D50 25GM
VIAL AND RECHECK BSG AN HOUR AFTER GIVING.

## 2020-05-14 VITALS — SYSTOLIC BLOOD PRESSURE: 107 MMHG | DIASTOLIC BLOOD PRESSURE: 63 MMHG

## 2020-05-14 VITALS — DIASTOLIC BLOOD PRESSURE: 70 MMHG

## 2020-05-14 VITALS — DIASTOLIC BLOOD PRESSURE: 49 MMHG

## 2020-05-14 NOTE — NUR
Discharge instructions reviewed with patient/family. Patient receptive and
verbalizes understanding. Follow-up care arranged. Written instructions given
to patient/family.
SERA HORN

## 2020-05-14 NOTE — NUR
OT NOTE
Pt was seen this A.M. 1:1 for 23 minute OT session. Upon arrival pt was supine
in bed. Pt identified by name and  and had no complaints at this time. Pt
transferred supine to sit EOB with supervision. While sitting EOB pt donned B
socks with supervision. Sit to stand completed from bed level with SBA for
safety followed by functional mobility into the bathroom with SBA for safety
due to bouts of unsteady stance. Pt then stood at commode while completing
toileting task with supervision. Pt then stood sink side while washing his
hands and face with supervision. Functional mobility was then completed around
the room without any UE support and SBA for safety. Pt was left sitting
upright in the recliner with call light in hand, tray table in place, and body
alarm activated for safety. Continue with POC as able.
 
ELOISA Nash/NICOLE

## 2020-05-14 NOTE — NUR
PT SET OFF BED ALARM.  PT ASKING WHEN HE IS BEING MOVED AND PUTTING HEARING
AIDES BACK IN.  PT REMINDED HE IS NOT LEAVING TONIGHT.  PT ACKNOWLEDGES.  BED
ALARM ON W/CALL LIGHT IN REACH.

## 2020-05-14 NOTE — NUR
TRIED TO CALL REPORT TO KATHERINE AMANDA. AIDE SAID SHE WAS DEALING WITH AN
EMERGENCY. TOOK THE NUMBER TO 4E AND SAID SHE WOULD CALL BACK.

## 2020-05-14 NOTE — NUR
PHYSICAL THERAPY
 
Patient seen this am 1;1 for therapy visit and was resting supine in bed upon
therapist arrival. Patient identified by name /  as Nurse was present
attending to patient secondary to low blood glucose level. Nurse gave him
juice to drink and stated it was OK to see patient at this time. OT assistant
was also present this morning for observation only as patient reported no new
c/o's.  Patient transfered supine to sit EOB with Supervision, then sit to
stand SBA x 1, ambulating SBA to bathroom 15'x 1, no AD. Patient ambulated
additional 25'x 1, demonstrating slow josefina and needed several v/c's to keep
his head up with increased B arm swing. Patient returned to bedside chair with
mild fatigue and was very pleasant / talkative, tolerating all treatment
without c/o. Patient rmained in chair with call light, tray table, telephone
and body alarm for safety. Will continue per POC as tolerated, total treatment
time 17 minutes.    Rafal Haider, PTA

## 2020-05-15 NOTE — NUR
PHYSICAL THERAPY CO-SIGN
 
 
I approve of the Phyical Therapy notes written above.
 
 
 
Swetha Lujan PT

## 2020-05-15 NOTE — NUR
OCCUPATIONAL THERAPY CO-SIGN
 
I approve of the Occupational Therapy notes written above.
 
CHAY DHILLON, OTR/L

## 2020-05-25 ENCOUNTER — HOSPITAL ENCOUNTER (OUTPATIENT)
Dept: HOSPITAL 83 - ED | Age: 85
Setting detail: OBSERVATION
LOS: 1 days | Discharge: HOME | End: 2020-05-26
Attending: INTERNAL MEDICINE | Admitting: INTERNAL MEDICINE
Payer: MEDICARE

## 2020-05-25 VITALS — SYSTOLIC BLOOD PRESSURE: 90 MMHG | DIASTOLIC BLOOD PRESSURE: 60 MMHG

## 2020-05-25 VITALS — SYSTOLIC BLOOD PRESSURE: 120 MMHG | DIASTOLIC BLOOD PRESSURE: 70 MMHG

## 2020-05-25 VITALS — WEIGHT: 145.06 LBS | HEIGHT: 60 IN

## 2020-05-25 VITALS — DIASTOLIC BLOOD PRESSURE: 52 MMHG

## 2020-05-25 VITALS — DIASTOLIC BLOOD PRESSURE: 91 MMHG

## 2020-05-25 DIAGNOSIS — N17.0: ICD-10-CM

## 2020-05-25 DIAGNOSIS — E11.22: ICD-10-CM

## 2020-05-25 DIAGNOSIS — I12.9: ICD-10-CM

## 2020-05-25 DIAGNOSIS — E78.5: ICD-10-CM

## 2020-05-25 DIAGNOSIS — E03.9: ICD-10-CM

## 2020-05-25 DIAGNOSIS — D72.829: ICD-10-CM

## 2020-05-25 DIAGNOSIS — E86.0: ICD-10-CM

## 2020-05-25 DIAGNOSIS — N18.3: ICD-10-CM

## 2020-05-25 DIAGNOSIS — E87.8: ICD-10-CM

## 2020-05-25 DIAGNOSIS — E87.1: ICD-10-CM

## 2020-05-25 DIAGNOSIS — E44.0: ICD-10-CM

## 2020-05-25 DIAGNOSIS — E11.65: Primary | ICD-10-CM

## 2020-05-25 LAB
ALBUMIN SERPL-MCNC: 2.9 GM/DL (ref 3.1–4.5)
ALP SERPL-CCNC: 83 U/L (ref 45–117)
ALT SERPL W P-5'-P-CCNC: 14 U/L (ref 12–78)
APPEARANCE UR: (no result)
APTT PPP: 32.1 SECONDS (ref 20–32.1)
AST SERPL-CCNC: 9 IU/L (ref 3–35)
BACTERIA #/AREA URNS HPF: (no result) /[HPF]
BASOPHILS # BLD AUTO: 0.1 10*3/UL (ref 0–0.1)
BASOPHILS NFR BLD AUTO: 0.5 % (ref 0–1)
BILIRUB UR QL STRIP: (no result)
BUN SERPL-MCNC: 38 MG/DL (ref 7–24)
CASTS URNS QL MICRO: (no result)
CHLORIDE SERPL-SCNC: 94 MMOL/L (ref 98–107)
COLOR UR: YELLOW
CREAT SERPL-MCNC: 1.99 MG/DL (ref 0.7–1.3)
EOSINOPHIL # BLD AUTO: 0.1 10*3/UL (ref 0–0.4)
EOSINOPHIL # BLD AUTO: 0.7 % (ref 1–4)
EPI CELLS #/AREA URNS HPF: (no result) /[HPF]
ERYTHROCYTE [DISTWIDTH] IN BLOOD BY AUTOMATED COUNT: 13 % (ref 0–14.5)
GLUCOSE UR QL: (no result)
HCT VFR BLD AUTO: 35.3 % (ref 42–52)
HGB UR QL STRIP: NEGATIVE
INR BLD: 1.1 (ref 2–3.5)
KETONES UR QL STRIP: (no result)
LEUKOCYTE ESTERASE UR QL STRIP: NEGATIVE
LIPASE SERPL-CCNC: 32 U/L (ref 73–393)
LYMPHOCYTES # BLD AUTO: 0.8 10*3/UL (ref 1.3–4.4)
LYMPHOCYTES NFR BLD AUTO: 6.9 % (ref 27–41)
MCH RBC QN AUTO: 28.9 PG (ref 27–31)
MCHC RBC AUTO-ENTMCNC: 33.4 G/DL (ref 33–37)
MCV RBC AUTO: 86.5 FL (ref 80–94)
MONOCYTES # BLD AUTO: 0.8 10*3/UL (ref 0.1–1)
MONOCYTES NFR BLD MANUAL: 7.2 % (ref 3–9)
MUCOUS THREADS URNS QL MICRO: (no result)
NEUT #: 9.8 10*3/UL (ref 2.3–7.9)
NEUT %: 84.2 % (ref 47–73)
NITRITE UR QL STRIP: NEGATIVE
NRBC BLD QL AUTO: 0 % (ref 0–0)
PH UR STRIP: 6 [PH] (ref 5–9)
PLATELET # BLD AUTO: 399 10*3/UL (ref 130–400)
PMV BLD AUTO: 11.1 FL (ref 9.6–12.3)
POTASSIUM SERPL-SCNC: 4.4 MMOL/L (ref 3.5–5.1)
PROT SERPL-MCNC: 7.3 GM/DL (ref 6.4–8.2)
RBC # BLD AUTO: 4.08 10*6/UL (ref 4.5–5.9)
RBC #/AREA URNS HPF: (no result) RBC/HPF (ref 0–2)
SODIUM SERPL-SCNC: 127 MMOL/L (ref 136–145)
SP GR UR: 1.01 (ref 1–1.03)
TROPONIN I SERPL-MCNC: 0.03 NG/ML (ref ?–0.04)
UROBILINOGEN UR STRIP-MCNC: 0.2 E.U./DL (ref 0.2–1)
WBC #/AREA URNS HPF: (no result) WBC/HPF (ref 0–5)
WBC NRBC COR # BLD AUTO: 11.6 10*3/UL (ref 4.8–10.8)

## 2020-05-25 NOTE — NUR
MSADMTime: N
A 89  year old MALE admitted to 4E
under services of ROCIO SOLITARIO DO.
Pt. arrived via bed from
ER.  Chief complaint: HYPERGLYCEMIA, DEHYDRATION.
 
ALEAH ESCAALNTE

## 2020-05-26 VITALS — DIASTOLIC BLOOD PRESSURE: 75 MMHG

## 2020-05-26 VITALS — DIASTOLIC BLOOD PRESSURE: 60 MMHG

## 2020-05-26 VITALS — SYSTOLIC BLOOD PRESSURE: 118 MMHG | DIASTOLIC BLOOD PRESSURE: 76 MMHG

## 2020-05-26 LAB
BASOPHILS # BLD AUTO: 0.1 10*3/UL (ref 0–0.1)
BASOPHILS NFR BLD AUTO: 0.6 % (ref 0–1)
BUN SERPL-MCNC: 37 MG/DL (ref 7–24)
CHLORIDE SERPL-SCNC: 103 MMOL/L (ref 98–107)
CREAT SERPL-MCNC: 1.61 MG/DL (ref 0.7–1.3)
EOSINOPHIL # BLD AUTO: 0.4 10*3/UL (ref 0–0.4)
EOSINOPHIL # BLD AUTO: 3.8 % (ref 1–4)
ERYTHROCYTE [DISTWIDTH] IN BLOOD BY AUTOMATED COUNT: 13.1 % (ref 0–14.5)
HCT VFR BLD AUTO: 35.1 % (ref 42–52)
LYMPHOCYTES # BLD AUTO: 1.3 10*3/UL (ref 1.3–4.4)
LYMPHOCYTES NFR BLD AUTO: 11.7 % (ref 27–41)
MCH RBC QN AUTO: 28.6 PG (ref 27–31)
MCHC RBC AUTO-ENTMCNC: 33 G/DL (ref 33–37)
MCV RBC AUTO: 86.5 FL (ref 80–94)
MONOCYTES # BLD AUTO: 1 10*3/UL (ref 0.1–1)
MONOCYTES NFR BLD MANUAL: 8.9 % (ref 3–9)
NEUT #: 8.2 10*3/UL (ref 2.3–7.9)
NEUT %: 74.6 % (ref 47–73)
NRBC BLD QL AUTO: 0 10*3/UL (ref 0–0)
PLATELET # BLD AUTO: 393 10*3/UL (ref 130–400)
PMV BLD AUTO: 11.3 FL (ref 9.6–12.3)
POTASSIUM SERPL-SCNC: 3.7 MMOL/L (ref 3.5–5.1)
RBC # BLD AUTO: 4.06 10*6/UL (ref 4.5–5.9)
SODIUM SERPL-SCNC: 136 MMOL/L (ref 136–145)
WBC NRBC COR # BLD AUTO: 11 10*3/UL (ref 4.8–10.8)

## 2020-05-26 NOTE — NUR
Spoke with daughter Judith Bowers states she will pull up outside front
entrance and call when she arrives.

## 2020-05-26 NOTE — NUR
in to see patient. He resides at [facility] and plans to
return there upon discharge.  notified.

## 2020-05-26 NOTE — NUR
Spoke with pt daughter Judith Bowers. Updated on treatment and pt
condition. States [facility].

## 2020-05-26 NOTE — NUR
LSW spoke with patients daughter about transporting patient back to [facility].
Patients daughter is on her way here now. LSW contacted Robina and informed
her of this. Patients daughter will meet the patient at the front door.

## 2020-05-31 ENCOUNTER — HOSPITAL ENCOUNTER (OUTPATIENT)
Dept: HOSPITAL 83 - ED | Age: 85
Setting detail: OBSERVATION
LOS: 1 days | Discharge: OTHER INSTITUTION | End: 2020-06-01
Attending: INTERNAL MEDICINE | Admitting: INTERNAL MEDICINE
Payer: MEDICARE

## 2020-05-31 VITALS — DIASTOLIC BLOOD PRESSURE: 73 MMHG | SYSTOLIC BLOOD PRESSURE: 103 MMHG

## 2020-05-31 VITALS — DIASTOLIC BLOOD PRESSURE: 76 MMHG | SYSTOLIC BLOOD PRESSURE: 108 MMHG

## 2020-05-31 VITALS — WEIGHT: 142.13 LBS | HEIGHT: 71.97 IN | BODY MASS INDEX: 19.25 KG/M2

## 2020-05-31 DIAGNOSIS — E11.22: ICD-10-CM

## 2020-05-31 DIAGNOSIS — E83.39: ICD-10-CM

## 2020-05-31 DIAGNOSIS — E78.5: ICD-10-CM

## 2020-05-31 DIAGNOSIS — E11.65: Primary | ICD-10-CM

## 2020-05-31 DIAGNOSIS — E43: ICD-10-CM

## 2020-05-31 DIAGNOSIS — N18.3: ICD-10-CM

## 2020-05-31 DIAGNOSIS — E87.6: ICD-10-CM

## 2020-05-31 DIAGNOSIS — E87.1: ICD-10-CM

## 2020-05-31 DIAGNOSIS — N17.0: ICD-10-CM

## 2020-05-31 DIAGNOSIS — E87.8: ICD-10-CM

## 2020-05-31 DIAGNOSIS — I12.9: ICD-10-CM

## 2020-05-31 DIAGNOSIS — E03.9: ICD-10-CM

## 2020-05-31 LAB
ALBUMIN SERPL-MCNC: 2.9 GM/DL (ref 3.1–4.5)
ALP SERPL-CCNC: 88 U/L (ref 45–117)
ALT SERPL W P-5'-P-CCNC: 13 U/L (ref 12–78)
AST SERPL-CCNC: 8 IU/L (ref 3–35)
BASOPHILS # BLD AUTO: 0.1 10*3/UL (ref 0–0.1)
BASOPHILS NFR BLD AUTO: 0.6 % (ref 0–1)
BUN SERPL-MCNC: 23 MG/DL (ref 7–24)
CHLORIDE SERPL-SCNC: 93 MMOL/L (ref 98–107)
CREAT SERPL-MCNC: 1.84 MG/DL (ref 0.7–1.3)
EOSINOPHIL # BLD AUTO: 0.1 10*3/UL (ref 0–0.4)
EOSINOPHIL # BLD AUTO: 1.3 % (ref 1–4)
ERYTHROCYTE [DISTWIDTH] IN BLOOD BY AUTOMATED COUNT: 13.3 % (ref 0–14.5)
HCT VFR BLD AUTO: 36.5 % (ref 42–52)
LYMPHOCYTES # BLD AUTO: 0.9 10*3/UL (ref 1.3–4.4)
LYMPHOCYTES NFR BLD AUTO: 8.6 % (ref 27–41)
MCH RBC QN AUTO: 28.4 PG (ref 27–31)
MCHC RBC AUTO-ENTMCNC: 32.9 G/DL (ref 33–37)
MCV RBC AUTO: 86.5 FL (ref 80–94)
MONOCYTES # BLD AUTO: 0.9 10*3/UL (ref 0.1–1)
MONOCYTES NFR BLD MANUAL: 8.4 % (ref 3–9)
NEUT #: 8.2 10*3/UL (ref 2.3–7.9)
NEUT %: 80.6 % (ref 47–73)
NRBC BLD QL AUTO: 0 10*3/UL (ref 0–0)
PLATELET # BLD AUTO: 412 10*3/UL (ref 130–400)
PMV BLD AUTO: 11.6 FL (ref 9.6–12.3)
POTASSIUM SERPL-SCNC: 4.4 MMOL/L (ref 3.5–5.1)
PROT SERPL-MCNC: 7.3 GM/DL (ref 6.4–8.2)
RBC # BLD AUTO: 4.22 10*6/UL (ref 4.5–5.9)
SODIUM SERPL-SCNC: 127 MMOL/L (ref 136–145)
TROPONIN I SERPL-MCNC: 0.04 NG/ML (ref ?–0.04)
WBC NRBC COR # BLD AUTO: 10.2 10*3/UL (ref 4.8–10.8)

## 2020-05-31 NOTE — NUR
VERIFIED ORDER FOR POTASSIUM IV WITH DR BARRIENTOS, PT POTASSIUM 4.4, DR ACKERMAN
STATES WILL RECHECK POTASSIUM LEVEL BEFORE ADMINISTERING MEDICATION. WILL HOLD
AT THIS TIME.

## 2020-06-01 VITALS — DIASTOLIC BLOOD PRESSURE: 62 MMHG | SYSTOLIC BLOOD PRESSURE: 98 MMHG

## 2020-06-01 VITALS — DIASTOLIC BLOOD PRESSURE: 72 MMHG

## 2020-06-01 VITALS — DIASTOLIC BLOOD PRESSURE: 79 MMHG

## 2020-06-01 VITALS — DIASTOLIC BLOOD PRESSURE: 71 MMHG

## 2020-06-01 LAB
25(OH)D3 SERPL-MCNC: 16.9 NG/ML (ref 30–100)
ALBUMIN SERPL-MCNC: 2.6 GM/DL (ref 3.1–4.5)
ALP SERPL-CCNC: 77 U/L (ref 45–117)
ALT SERPL W P-5'-P-CCNC: 11 U/L (ref 12–78)
AST SERPL-CCNC: 7 IU/L (ref 3–35)
BASOPHILS # BLD AUTO: 0.1 10*3/UL (ref 0–0.1)
BASOPHILS NFR BLD AUTO: 0.9 % (ref 0–1)
BUN SERPL-MCNC: 21 MG/DL (ref 7–24)
BUN SERPL-MCNC: 21 MG/DL (ref 7–24)
CHLORIDE SERPL-SCNC: 97 MMOL/L (ref 98–107)
CHLORIDE SERPL-SCNC: 99 MMOL/L (ref 98–107)
CREAT SERPL-MCNC: 1.46 MG/DL (ref 0.7–1.3)
CREAT SERPL-MCNC: 1.68 MG/DL (ref 0.7–1.3)
EOSINOPHIL # BLD AUTO: 0.3 10*3/UL (ref 0–0.4)
EOSINOPHIL # BLD AUTO: 3.5 % (ref 1–4)
ERYTHROCYTE [DISTWIDTH] IN BLOOD BY AUTOMATED COUNT: 13.3 % (ref 0–14.5)
HCT VFR BLD AUTO: 32.1 % (ref 42–52)
LYMPHOCYTES # BLD AUTO: 1.3 10*3/UL (ref 1.3–4.4)
LYMPHOCYTES NFR BLD AUTO: 13.4 % (ref 27–41)
MCH RBC QN AUTO: 28.7 PG (ref 27–31)
MCHC RBC AUTO-ENTMCNC: 33.3 G/DL (ref 33–37)
MCV RBC AUTO: 86.1 FL (ref 80–94)
MONOCYTES # BLD AUTO: 0.7 10*3/UL (ref 0.1–1)
MONOCYTES NFR BLD MANUAL: 7 % (ref 3–9)
NEUT #: 7 10*3/UL (ref 2.3–7.9)
NEUT %: 74.8 % (ref 47–73)
NRBC BLD QL AUTO: 0 10*3/UL (ref 0–0)
PLATELET # BLD AUTO: 384 10*3/UL (ref 130–400)
PMV BLD AUTO: 11.1 FL (ref 9.6–12.3)
POTASSIUM SERPL-SCNC: 3.1 MMOL/L (ref 3.5–5.1)
POTASSIUM SERPL-SCNC: 3.7 MMOL/L (ref 3.5–5.1)
PROT SERPL-MCNC: 6.4 GM/DL (ref 6.4–8.2)
RBC # BLD AUTO: 3.73 10*6/UL (ref 4.5–5.9)
SODIUM SERPL-SCNC: 130 MMOL/L (ref 136–145)
SODIUM SERPL-SCNC: 132 MMOL/L (ref 136–145)
T4 FREE SERPL-MCNC: 1.43 NG/DL (ref 0.76–1.46)
TSH SERPL DL<=0.005 MIU/L-ACNC: 0.67 UIU/ML (ref 0.36–4.75)
VITAMIN B12: 514 PG/ML (ref 247–911)
WBC NRBC COR # BLD AUTO: 9.4 10*3/UL (ref 4.8–10.8)

## 2020-06-01 NOTE — NUR
Discharge instructions reviewed with patient/family. Patient receptive and
verbalizes understanding. Follow-up care arranged. Written instructions given
to patient/family.
HEIDI RADER

## 2020-06-01 NOTE — NUR
DR. MELISSA NOTIFIED OF UP TO DATE MEDICATIONS. ORDERS RECEIVED. ORDERED TO
CHECK PT BSG AT 0300 AND GIVE INSULIN ACCORDING TO SS.

## 2020-06-01 NOTE — NUR
MED REC UPDATED WITH PAPERWORK FROM KATHERINE. METFORMIN & INSULIN SLIDING
SCALE NOT ON THIS MED REC.  NOTIFIED.

## 2020-06-01 NOTE — NUR
PER CRISTIANA-TONY, PATIENT WILL NEED A RAPID COVID TEST BEFORE RETURNING TO
[facility].

## 2020-06-01 NOTE — NUR
HARLEYW CONTACTED TEVIN, SHE IS CHECKING TO SEE IF TRANSPORTATION WILL BE
AVAILABLE AT 1:30PM TODAY.

## 2020-06-01 NOTE — NUR
A 89, admitted to 5E, under the
services of REGINALD Deng DO with a diagnosis of HYPERGLYCEMIA.
Chief complaint is HYPERGLYCEMIA.
Patient arrived via bed from ER.
Monitor applied. Initial assessment completed.
Vital signs taken and recorded.
REGINALD DENG DO notified of admission to the unit.
Orders received.
See assessment for past medical history, medications
and allergies.
Patient and/or family oriented to unit. [facility]
visitation policy reviewed.
Clothing/patient valuable form completed.
 
ALIYAH VARELA

## 2020-06-01 NOTE — NUR
NOTIFIED DR KERR OF CRITICAL FINDING CALLED FROM [facility]. PT
HAD BLE VENOUS US SHOWING A LEFT NON OCCLUSIVE DVT IN LEFT COMMON FEMORAL
VEIN. PT STARTED XARALTO THIS MORNING. HEPARIN D/C'D PER PHYSICIAN ORDER.

## 2020-07-30 ENCOUNTER — HOSPITAL ENCOUNTER (EMERGENCY)
Dept: HOSPITAL 83 - ED | Age: 85
LOS: 1 days | Discharge: HOME HEALTH | End: 2020-07-31
Payer: MEDICARE

## 2020-07-30 VITALS — BODY MASS INDEX: 20.59 KG/M2 | WEIGHT: 152 LBS | HEIGHT: 71.97 IN

## 2020-07-30 VITALS — DIASTOLIC BLOOD PRESSURE: 70 MMHG

## 2020-07-30 DIAGNOSIS — Z79.4: ICD-10-CM

## 2020-07-30 DIAGNOSIS — Z88.6: ICD-10-CM

## 2020-07-30 DIAGNOSIS — E11.22: ICD-10-CM

## 2020-07-30 DIAGNOSIS — Z79.82: ICD-10-CM

## 2020-07-30 DIAGNOSIS — Z03.818: Primary | ICD-10-CM

## 2020-07-30 DIAGNOSIS — Z79.899: ICD-10-CM

## 2020-07-30 DIAGNOSIS — Z86.718: ICD-10-CM

## 2020-07-30 DIAGNOSIS — Z88.8: ICD-10-CM

## 2020-07-30 DIAGNOSIS — N18.3: ICD-10-CM

## 2020-07-30 DIAGNOSIS — R45.851: ICD-10-CM

## 2020-07-30 DIAGNOSIS — I25.2: ICD-10-CM

## 2020-07-30 DIAGNOSIS — I13.0: ICD-10-CM

## 2020-07-30 DIAGNOSIS — E03.9: ICD-10-CM

## 2020-07-30 DIAGNOSIS — I25.10: ICD-10-CM

## 2020-07-30 DIAGNOSIS — Z91.041: ICD-10-CM

## 2020-07-30 DIAGNOSIS — E78.5: ICD-10-CM

## 2020-07-30 LAB
ALBUMIN SERPL-MCNC: 2.9 GM/DL (ref 3.1–4.5)
ALP SERPL-CCNC: 106 U/L (ref 45–117)
ALT SERPL W P-5'-P-CCNC: 38 U/L (ref 12–78)
AMPHETAMINES UR QL SCN: < 1000
APAP SERPL-MCNC: < 5 UG/ML (ref 10–30)
APPEARANCE UR: CLEAR
AST SERPL-CCNC: 28 IU/L (ref 3–35)
BARBITURATES UR QL SCN: < 200
BASOPHILS # BLD AUTO: 0.1 10*3/UL (ref 0–0.1)
BASOPHILS NFR BLD AUTO: 0.6 % (ref 0–1)
BENZODIAZ UR QL SCN: < 200
BILIRUB UR QL STRIP: NEGATIVE
BUN SERPL-MCNC: 31 MG/DL (ref 7–24)
BZE UR QL SCN: < 300
CANNABINOIDS UR QL SCN: < 50
CHLORIDE SERPL-SCNC: 96 MMOL/L (ref 98–107)
COLOR UR: YELLOW
CREAT SERPL-MCNC: 1.69 MG/DL (ref 0.7–1.3)
EOSINOPHIL # BLD AUTO: 0.1 10*3/UL (ref 0–0.4)
EOSINOPHIL # BLD AUTO: 1.7 % (ref 1–4)
ERYTHROCYTE [DISTWIDTH] IN BLOOD BY AUTOMATED COUNT: 15.9 % (ref 0–14.5)
ETHANOL SERPL-MCNC: < 3 MG/DL (ref ?–3)
GLUCOSE UR QL: NEGATIVE
HCT VFR BLD AUTO: 31.6 % (ref 42–52)
HGB UR QL STRIP: NEGATIVE
KETONES UR QL STRIP: NEGATIVE
LEUKOCYTE ESTERASE UR QL STRIP: NEGATIVE
LYMPHOCYTES # BLD AUTO: 0.8 10*3/UL (ref 1.3–4.4)
LYMPHOCYTES NFR BLD AUTO: 10.4 % (ref 27–41)
MCH RBC QN AUTO: 27.2 PG (ref 27–31)
MCHC RBC AUTO-ENTMCNC: 32.6 G/DL (ref 33–37)
MCV RBC AUTO: 83.6 FL (ref 80–94)
METHADONE UR QL SCN: < 300
MONOCYTES # BLD AUTO: 0.7 10*3/UL (ref 0.1–1)
MONOCYTES NFR BLD MANUAL: 9.1 % (ref 3–9)
MYOGLOBIN SERPL-MCNC: 91 NG/ML (ref 16–116)
NEUT #: 6.1 10*3/UL (ref 2.3–7.9)
NEUT %: 78.1 % (ref 47–73)
NITRITE UR QL STRIP: NEGATIVE
NRBC BLD QL AUTO: 0 10*3/UL (ref 0–0)
OPIATES UR QL SCN: < 300
PCP UR QL SCN: <  25
PH UR STRIP: 6.5 [PH] (ref 5–9)
PLATELET # BLD AUTO: 354 10*3/UL (ref 130–400)
PMV BLD AUTO: 10.3 FL (ref 9.6–12.3)
POTASSIUM SERPL-SCNC: 4.3 MMOL/L (ref 3.5–5.1)
PROT SERPL-MCNC: 7.1 GM/DL (ref 6.4–8.2)
RBC # BLD AUTO: 3.78 10*6/UL (ref 4.5–5.9)
SODIUM SERPL-SCNC: 127 MMOL/L (ref 136–145)
SP GR UR: 1.01 (ref 1–1.03)
UROBILINOGEN UR STRIP-MCNC: 0.2 E.U./DL (ref 0.2–1)
WBC NRBC COR # BLD AUTO: 7.8 10*3/UL (ref 4.8–10.8)

## 2020-08-03 LAB — PRO BNP, N-TERMINAL: ABNORMAL PG/ML (ref 0–450)

## 2020-12-29 NOTE — NUR
LSW SPOKE WITH RN. LSW ARRANGED FOR [facility] TO PICK THE PATIENT UP AT THE ER DOORS
AT 130PM. LSW SPOKE WITH PATIENTS RAI FREEMAN ABOUT DISCHARGE AND
TRANSPORTATION. Home